# Patient Record
Sex: MALE | Race: AMERICAN INDIAN OR ALASKA NATIVE | NOT HISPANIC OR LATINO | Employment: UNEMPLOYED | URBAN - METROPOLITAN AREA
[De-identification: names, ages, dates, MRNs, and addresses within clinical notes are randomized per-mention and may not be internally consistent; named-entity substitution may affect disease eponyms.]

---

## 2018-09-03 ENCOUNTER — NURSE TRIAGE (OUTPATIENT)
Dept: NURSING | Facility: CLINIC | Age: 38
End: 2018-09-03

## 2018-09-03 ENCOUNTER — HOSPITAL ENCOUNTER (EMERGENCY)
Facility: CLINIC | Age: 38
Discharge: ANOTHER HOSPITAL | End: 2018-09-03
Attending: EMERGENCY MEDICINE | Admitting: EMERGENCY MEDICINE
Payer: COMMERCIAL

## 2018-09-03 VITALS
HEART RATE: 95 BPM | TEMPERATURE: 98.2 F | SYSTOLIC BLOOD PRESSURE: 144 MMHG | RESPIRATION RATE: 20 BRPM | DIASTOLIC BLOOD PRESSURE: 98 MMHG | WEIGHT: 224 LBS | OXYGEN SATURATION: 96 %

## 2018-09-03 DIAGNOSIS — F19.959 DRUG-INDUCED PSYCHOTIC DISORDER WITH COMPLICATION (H): ICD-10-CM

## 2018-09-03 DIAGNOSIS — R45.1 AGITATION REQUIRING SEDATION PROTOCOL: ICD-10-CM

## 2018-09-03 DIAGNOSIS — F15.10 METHAMPHETAMINE ABUSE (H): ICD-10-CM

## 2018-09-03 LAB
ALBUMIN SERPL-MCNC: 4.3 G/DL (ref 3.4–5)
ALP SERPL-CCNC: 83 U/L (ref 40–150)
ALT SERPL W P-5'-P-CCNC: 62 U/L (ref 0–70)
ANION GAP SERPL CALCULATED.3IONS-SCNC: 10 MMOL/L (ref 3–14)
ANION GAP SERPL CALCULATED.3IONS-SCNC: 12 MMOL/L (ref 3–14)
APAP SERPL-MCNC: <2 MG/L (ref 10–20)
AST SERPL W P-5'-P-CCNC: 76 U/L (ref 0–45)
BASE DEFICIT BLDV-SCNC: 0.8 MMOL/L
BASOPHILS # BLD AUTO: 0.1 10E9/L (ref 0–0.2)
BASOPHILS NFR BLD AUTO: 0.5 %
BILIRUB SERPL-MCNC: 1 MG/DL (ref 0.2–1.3)
BUN SERPL-MCNC: 16 MG/DL (ref 7–30)
BUN SERPL-MCNC: 18 MG/DL (ref 7–30)
CALCIUM SERPL-MCNC: 8.3 MG/DL (ref 8.5–10.1)
CALCIUM SERPL-MCNC: 8.4 MG/DL (ref 8.5–10.1)
CHLORIDE SERPL-SCNC: 104 MMOL/L (ref 94–109)
CHLORIDE SERPL-SCNC: 105 MMOL/L (ref 94–109)
CO2 SERPL-SCNC: 25 MMOL/L (ref 20–32)
CO2 SERPL-SCNC: 25 MMOL/L (ref 20–32)
CREAT SERPL-MCNC: 0.95 MG/DL (ref 0.66–1.25)
CREAT SERPL-MCNC: 0.97 MG/DL (ref 0.66–1.25)
DIFFERENTIAL METHOD BLD: NORMAL
EOSINOPHIL # BLD AUTO: 0 10E9/L (ref 0–0.7)
EOSINOPHIL NFR BLD AUTO: 0.1 %
ERYTHROCYTE [DISTWIDTH] IN BLOOD BY AUTOMATED COUNT: 11.7 % (ref 10–15)
ETHANOL SERPL-MCNC: <0.01 G/DL
GFR SERPL CREATININE-BSD FRML MDRD: 88 ML/MIN/1.7M2
GFR SERPL CREATININE-BSD FRML MDRD: 90 ML/MIN/1.7M2
GLUCOSE SERPL-MCNC: 107 MG/DL (ref 70–99)
GLUCOSE SERPL-MCNC: 93 MG/DL (ref 70–99)
HCO3 BLDV-SCNC: 24 MMOL/L (ref 21–28)
HCT VFR BLD AUTO: 44.1 % (ref 40–53)
HGB BLD-MCNC: 15.5 G/DL (ref 13.3–17.7)
IMM GRANULOCYTES # BLD: 0 10E9/L (ref 0–0.4)
IMM GRANULOCYTES NFR BLD: 0.4 %
INTERPRETATION ECG - MUSE: NORMAL
INTERPRETATION ECG - MUSE: NORMAL
LACTATE SERPL-SCNC: 1.4 MMOL/L (ref 0.4–2)
LYMPHOCYTES # BLD AUTO: 2.4 10E9/L (ref 0.8–5.3)
LYMPHOCYTES NFR BLD AUTO: 21.7 %
MCH RBC QN AUTO: 30.4 PG (ref 26.5–33)
MCHC RBC AUTO-ENTMCNC: 35.1 G/DL (ref 31.5–36.5)
MCV RBC AUTO: 87 FL (ref 78–100)
MONOCYTES # BLD AUTO: 1.1 10E9/L (ref 0–1.3)
MONOCYTES NFR BLD AUTO: 10 %
NEUTROPHILS # BLD AUTO: 7.4 10E9/L (ref 1.6–8.3)
NEUTROPHILS NFR BLD AUTO: 67.3 %
NRBC # BLD AUTO: 0 10*3/UL
NRBC BLD AUTO-RTO: 0 /100
O2/TOTAL GAS SETTING VFR VENT: ABNORMAL %
OXYHGB MFR BLDV: 84 %
PCO2 BLDV: 39 MM HG (ref 40–50)
PH BLDV: 7.4 PH (ref 7.32–7.43)
PLATELET # BLD AUTO: 280 10E9/L (ref 150–450)
PO2 BLDV: 50 MM HG (ref 25–47)
POTASSIUM SERPL-SCNC: 3 MMOL/L (ref 3.4–5.3)
POTASSIUM SERPL-SCNC: 3.3 MMOL/L (ref 3.4–5.3)
PROT SERPL-MCNC: 7.4 G/DL (ref 6.8–8.8)
RBC # BLD AUTO: 5.1 10E12/L (ref 4.4–5.9)
SALICYLATES SERPL-MCNC: <2 MG/DL
SODIUM SERPL-SCNC: 140 MMOL/L (ref 133–144)
SODIUM SERPL-SCNC: 141 MMOL/L (ref 133–144)
TROPONIN I SERPL-MCNC: <0.015 UG/L (ref 0–0.04)
WBC # BLD AUTO: 11 10E9/L (ref 4–11)

## 2018-09-03 PROCEDURE — 25000125 ZZHC RX 250

## 2018-09-03 PROCEDURE — 84484 ASSAY OF TROPONIN QUANT: CPT | Performed by: EMERGENCY MEDICINE

## 2018-09-03 PROCEDURE — 96361 HYDRATE IV INFUSION ADD-ON: CPT

## 2018-09-03 PROCEDURE — 80053 COMPREHEN METABOLIC PANEL: CPT | Performed by: EMERGENCY MEDICINE

## 2018-09-03 PROCEDURE — 25000132 ZZH RX MED GY IP 250 OP 250 PS 637: Performed by: EMERGENCY MEDICINE

## 2018-09-03 PROCEDURE — 90791 PSYCH DIAGNOSTIC EVALUATION: CPT

## 2018-09-03 PROCEDURE — 99285 EMERGENCY DEPT VISIT HI MDM: CPT | Mod: 25

## 2018-09-03 PROCEDURE — 82805 BLOOD GASES W/O2 SATURATION: CPT | Performed by: EMERGENCY MEDICINE

## 2018-09-03 PROCEDURE — 83605 ASSAY OF LACTIC ACID: CPT | Performed by: EMERGENCY MEDICINE

## 2018-09-03 PROCEDURE — 80329 ANALGESICS NON-OPIOID 1 OR 2: CPT | Performed by: EMERGENCY MEDICINE

## 2018-09-03 PROCEDURE — 80329 ANALGESICS NON-OPIOID 1 OR 2: CPT | Mod: 91 | Performed by: EMERGENCY MEDICINE

## 2018-09-03 PROCEDURE — 25000128 H RX IP 250 OP 636: Performed by: EMERGENCY MEDICINE

## 2018-09-03 PROCEDURE — 80307 DRUG TEST PRSMV CHEM ANLYZR: CPT | Mod: 59 | Performed by: EMERGENCY MEDICINE

## 2018-09-03 PROCEDURE — 96372 THER/PROPH/DIAG INJ SC/IM: CPT

## 2018-09-03 PROCEDURE — 25000125 ZZHC RX 250: Performed by: EMERGENCY MEDICINE

## 2018-09-03 PROCEDURE — 81001 URINALYSIS AUTO W/SCOPE: CPT | Mod: XU | Performed by: EMERGENCY MEDICINE

## 2018-09-03 PROCEDURE — 96375 TX/PRO/DX INJ NEW DRUG ADDON: CPT

## 2018-09-03 PROCEDURE — 80320 DRUG SCREEN QUANTALCOHOLS: CPT | Mod: 59 | Performed by: EMERGENCY MEDICINE

## 2018-09-03 PROCEDURE — 80299 QUANTITATIVE ASSAY DRUG: CPT | Mod: 91 | Performed by: EMERGENCY MEDICINE

## 2018-09-03 PROCEDURE — 80307 DRUG TEST PRSMV CHEM ANLYZR: CPT | Performed by: EMERGENCY MEDICINE

## 2018-09-03 PROCEDURE — 80048 BASIC METABOLIC PNL TOTAL CA: CPT | Performed by: EMERGENCY MEDICINE

## 2018-09-03 PROCEDURE — 85025 COMPLETE CBC W/AUTO DIFF WBC: CPT | Performed by: EMERGENCY MEDICINE

## 2018-09-03 PROCEDURE — 36415 COLL VENOUS BLD VENIPUNCTURE: CPT | Performed by: EMERGENCY MEDICINE

## 2018-09-03 PROCEDURE — 96374 THER/PROPH/DIAG INJ IV PUSH: CPT

## 2018-09-03 PROCEDURE — 93005 ELECTROCARDIOGRAM TRACING: CPT

## 2018-09-03 PROCEDURE — 93005 ELECTROCARDIOGRAM TRACING: CPT | Mod: 76

## 2018-09-03 RX ORDER — POTASSIUM CL/LIDO/0.9 % NACL 10MEQ/0.1L
10 INTRAVENOUS SOLUTION, PIGGYBACK (ML) INTRAVENOUS
Status: DISCONTINUED | OUTPATIENT
Start: 2018-09-03 | End: 2018-09-03 | Stop reason: HOSPADM

## 2018-09-03 RX ORDER — KETAMINE HYDROCHLORIDE 100 MG/ML
400 INJECTION INTRAMUSCULAR; INTRAVENOUS ONCE
Status: COMPLETED | OUTPATIENT
Start: 2018-09-03 | End: 2018-09-03

## 2018-09-03 RX ORDER — LORAZEPAM 2 MG/ML
0.5 INJECTION INTRAMUSCULAR ONCE
Status: DISCONTINUED | OUTPATIENT
Start: 2018-09-03 | End: 2018-09-03

## 2018-09-03 RX ORDER — POTASSIUM CHLORIDE 1.5 G/1.58G
20 POWDER, FOR SOLUTION ORAL ONCE
Status: COMPLETED | OUTPATIENT
Start: 2018-09-03 | End: 2018-09-03

## 2018-09-03 RX ORDER — LORAZEPAM 2 MG/ML
1 INJECTION INTRAMUSCULAR ONCE
Status: DISCONTINUED | OUTPATIENT
Start: 2018-09-03 | End: 2018-09-03

## 2018-09-03 RX ORDER — DIAZEPAM 10 MG/2ML
5 INJECTION, SOLUTION INTRAMUSCULAR; INTRAVENOUS ONCE
Status: DISCONTINUED | OUTPATIENT
Start: 2018-09-03 | End: 2018-09-03

## 2018-09-03 RX ORDER — LORAZEPAM 1 MG/1
2 TABLET ORAL ONCE
Status: COMPLETED | OUTPATIENT
Start: 2018-09-03 | End: 2018-09-03

## 2018-09-03 RX ORDER — KETAMINE HYDROCHLORIDE 100 MG/ML
INJECTION, SOLUTION INTRAMUSCULAR; INTRAVENOUS
Status: COMPLETED
Start: 2018-09-03 | End: 2018-09-03

## 2018-09-03 RX ORDER — LORAZEPAM 2 MG/ML
2 INJECTION INTRAMUSCULAR ONCE
Status: COMPLETED | OUTPATIENT
Start: 2018-09-03 | End: 2018-09-03

## 2018-09-03 RX ADMIN — LORAZEPAM 2 MG: 1 TABLET ORAL at 16:24

## 2018-09-03 RX ADMIN — KETAMINE HYDROCHLORIDE 400 MG: 100 INJECTION, SOLUTION, CONCENTRATE INTRAMUSCULAR; INTRAVENOUS at 17:35

## 2018-09-03 RX ADMIN — LORAZEPAM 2 MG: 2 INJECTION INTRAMUSCULAR; INTRAVENOUS at 18:37

## 2018-09-03 RX ADMIN — SODIUM CHLORIDE 1000 ML: 9 INJECTION, SOLUTION INTRAVENOUS at 18:37

## 2018-09-03 RX ADMIN — POTASSIUM CHLORIDE 10 MEQ: 149 INJECTION, SOLUTION, CONCENTRATE INTRAVENOUS at 16:15

## 2018-09-03 RX ADMIN — KETAMINE HYDROCHLORIDE 400 MG: 100 INJECTION INTRAMUSCULAR; INTRAVENOUS at 17:35

## 2018-09-03 RX ADMIN — POTASSIUM CHLORIDE 20 MEQ: 1.5 POWDER, FOR SOLUTION ORAL at 16:14

## 2018-09-03 RX ADMIN — SODIUM CHLORIDE 1000 ML: 9 INJECTION, SOLUTION INTRAVENOUS at 11:26

## 2018-09-03 NOTE — ED TRIAGE NOTES
"Pt presents via EMS for stating he \"paniced\" after having \"shot up\" and ate some meth but unsure what time. Pt is cooperative, A&O, ABC's intact.   "

## 2018-09-03 NOTE — ED PROVIDER NOTES
"  History     Chief Complaint:  Drug use    HPI   David Crane is a 38 year old male who presents to the emergency department for meth use. He states that he has been living at a hotel in [place] for the past two days because his wife was tired of his drug use. Today is Monday (Labor Day) and the patient states he was \"kicked out\" of his house on Saturday.  The patient states that he was in his hotel room this morning and \"shot up with meth\" and subsequently ate \"whatever was left\" but is unsure of when this occurred.  Patient thinks this was several hours ago but is unclear.  He called 911 due to \"freaking out\" and presents for evaluation.   He notes that he has been using meth the past 2.5 days and typically has several day binges when he uses drugs every three to six months or so, per patient's report. He did not drink any alcohol.   Patient works for [employer] and is  with an infant child at home.  No suicidal or homicidal ideations.    Allergies:  No known drug allergies     Medications:    Tobradex       Past Medical History:    Drug abuse    Past Surgical History:    Strabismus surgery     Family History:    History reviewed. No pertinent family history.     Social History:  Smoking status: Former Smoker  Alcohol use: Yes    Marital Status:          Review of Systems   Psychiatric/Behavioral: Positive for self-injury.     Pt presents via EMS for stating he \"panicked\" after having \"shot up\" and ate some meth but unsure what time.     Physical Exam     Patient Vitals for the past 24 hrs:   BP Temp Temp src Pulse Resp SpO2 Weight   09/03/18 1057 (!) 156/112 98.2  F (36.8  C) Oral 78 20 98 % 101.6 kg (224 lb)         Physical Exam  GEN: patient conversive but hyperaware of surroundings, not diaphoretic  HEAD: atraumatic, normocephalic  EYES: pupils reactive (5plus and reactive), extraocular muscles intact, conjunctivae normal  ENT: TMs flat and white bilaterally, oropharynx normal with no erythema or " exudate, mucus membranes dry  NECK: no posterior midline tenderness, no cervical LAD  RESPIRATORY: no tachypnea, breath sounds clear to auscultation (no rales, wheezes, rhonchi), chest wall nontender, normal phonation  CVS: normal S1/S2, no murmurs/rubs/gallops, tachycardic  ABDOMEN: soft, nontender, no masses or organomegaly, no rebound, decreased bowel sounds  BACK: no spinal tenderness  EXTREMITIES: intact pulses x 4, full range of motion at joints, no edema  MUSCULOSKELETAL: no deformities  SKIN: warm and dry, scratches on arms diffusely.  Track marks.  No warmth or erythema.  NEURO: GCS 14, cranial nerves intact.  Motor- moves all 4 extremities with 5/5 strength.  Sensation- intact  Reflexes- DTRs 2plus.  Coordination- not checked.  Overall symmetrical exam  HEME: no bruising        Emergency Department Course   ECG (11:11:28):  Rate 108 bpm. SD interval 146 . QRS duration 88. QT/QTc 344/460. P-R-T axes 32 21 37. Sinus tachycardia, otherwise normal ECG, Interpreted at 1111 by Alysia Hurd MD.  Sinus tachycardia with no STEMI    Laboratory:  CBC: WNL (WBC 11.0, HGB 15.5, )  CMP:Potassium 3.0 (L), Glucose 107 (H), Calcium 8.3 (L), AST 76 (H),  (Creatinine 0.97)    Alcohol ethyl <0.01  Acetaminophen <2  Salicylate <2    Troponin: <0.015    UA: pending  UDS: pending    Interventions:  1126 NaCl BOLUS 1000ml IV  1129 Ativan 1mg IV  Heplock  Cardiac/Sp02 monitoring  Oxygen by nasal cannula at 2L/min  Potassium 20mEq Kcl PO  Valium 5mg IV    Emergency Department Course:  Past medical records, nursing notes, and vitals reviewed.  1100: I performed an exam of the patient and obtained history, as documented above.    IV inserted and blood drawn.    Noon- patient sleeping      ED Course   2pm sleeping, awakens and still mildly confused    2:30pm signed out to Dr Sanchez  BP (!) 152/99  Pulse 78  Temp 98.2  F (36.8  C) (Oral)  Resp 20  Wt 101.6 kg (224 lb)  SpO2 98%      Impression & Plan      Medical  "Decision Making:  David Crane is a 38 year old gentleman who lives in [place] who admits that he has been on a \"nogueira\" the last couple of days using injection meth, however he \"freaked out\" and swallowed an unknown amount just prior to arrival. He does not feel suicidal or homicidal but is agitated and mildly confusion along with hypervigilance. When he first arrived his pupils were dilated, and he is hyperacute with his environment and hyperstimulation. An IV was placed and labs were sent. Potassium is low at 3, he has been given oral and IV potassium repletion.  Ativan was given to facilitate relaxation.  HAMLET was signed.    CBC with no anemia and normal WBC.   Ethanol was normal, tylenol/salicylates were normal and troponin is otherwise normal. His EKG showed sign of tachycardia. We are waiting on urine analysis and the drugs of abuse and he is getting potassium.  Encouraged nursing staff to catheterization for a UA.  IV hydration initiated.   Later in the day DEC will need to see him and talk with him but at this point he is not medically stable. He will be signed out to the oncoming physician at 2pm.     Needs IV fluids and further observation.  Not diaphoretic or tachycardic but patient is hypertensive.    Diagnosis:  Drug ingestion    Disposition:  Patient was signed out to my partner Dr Sanchez    Discharge Medications:  New Prescriptions    No medications on file         Parker Sky  9/3/2018   [facility] EMERGENCY DEPARTMENT    Scribe Disclosure:  I, Parker Sky, am serving as a scribe at 11:00 AM on 9/3/2018 to document services personally performed by Alysia Hurd MD based on my observations and the provider's statements to me.        Alysia Hurd MD  09/03/18 2021       Alysia Hurd MD  09/03/18 2024    "

## 2018-09-03 NOTE — ED NOTES
Bed: ED04  Expected date: 9/3/18  Expected time: 10:42 AM  Means of arrival: Ambulance  Comments:  38 M

## 2018-09-03 NOTE — ED PROVIDER NOTES
This patient was received in sign out from Dr. Hurd, please see her note for initial presentation and ED course.  At time of signout, patient was being monitored for methamphetamine ingestion after he reported swallowing an unknown amount of methamphetamine.  His intent for self-harm versus recreation is unknown at this time.    Upon my evaluation patient appeared confused, with pressured speech and labile mood unable to participate in a coherent conversation, peering leering around the room, fidgeting with elevated heart rate and blood pressure on the monitor consistent with sympathomimetic ingestion and possible drug-induced psychosis.  He was offered both IV and p.o. Ativan to counteract his presumed methamphetamine ingestion but he declined demanding to be transferred to group home or shelter.  After taking 2 mg of p.o. Ativan, he was able to participate in evaluation with the mental health  who felt he likely could go home with close follow-up as an outpatient for chemical dependency given he likely was undergoing acute distress after situational complications with his significant other when he was kicked out of the home on Saturday and that his events today did not likely represent suicidal gesture.     However, during the interview his wife and young infant daughter arrived at which point he attempted to leave his room and was reminded by security to stay in his room given his hold status. He then aggressively grabbed his young infant who was in a stroller and pulled her into the room pushing her in a corner and positioning himself between his daughter and the door in a threatening, challenging manner.  With concern for the patient's escalating behavior and his young infant in the room security attempted to physically subdue the patient. He became supremely agitated requiring a code 21 activation, which is a behavioral emergency activation with multiple nurses and security guards required to take the  patient down to the ground.  He was given 400 mg of IM ketamine for chemical sedation due to his severe agitation, erratic behavior and threatening gesture.  He was then placed in 5 point restraints,  back on a cardiac monitor, and EKG and labs were sent. His breathing status was closely monitoring. He continued to be agitated requiring several doses of IV Ativan which improved his hypertension and tachycardia. IV fluid also given. His lab work was unrevealing.  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      Initial plan was for admission to the ICU here at [facility] but due to bed availability in the ICU, he was not accepted here.  Plan was then to transfer patient to [facility]; however, due to patient's preference as expressed by his wife, he preferred not be in the [facility] system due to an unknown issue. As such, transfer to [facility] was arranged as this is the closest appropriate hospital that is also closest to the patient's home.  The patient's wife became upset demanding to dictate patient's care, demanding to approve of all medications and then demanded to have the patient transferred to [facility] despite this being within the [facility] system and against the preferences she had already stated.  Due to an escalation of inappropriate and aggressive behavior that was interfering with both this patient and other patients' care, she was asked to leave and refused.  As a result, Taiwo MCGREGOR arrived to reiterate that the patient does not have legal rights in the setting of a 72 hour hold despite being his wife and she was escorted off the property.    Patient was transferred to [facility] with planned admission to medical ICU team for ongoing monitoring of drug-induced psychosis in  the setting of methamphetamine abuse and ingestion.  He warranted ICU admission for close hemodynamic monitoring given unknown amount of meth ingestion in unknown packaging with difficult to predict pharmacokinetics.  He remained hemodynamically stable while in the emergency department after the above sedation and his airway remained patent and he was protecting it.  He was transferred via ACLS to [facility].    Stephan Sanchez MD   [employer]  11:28 PM 09/03/18        Laura, Stephan Mitchell MD  09/03/18 3144

## 2018-09-04 NOTE — ED NOTES
"Pts wife notified of 72hour hold by charge RN, pts wife continues to insist that she has \"half the legal right to make medical decisions for her \" despite having HAMLET and hold information described to her multiple times by 2 RNs and charge RN. Pts wife threatening to call 911, charge RN notified PD about situation. PD spoke with pt while in ED, provided additional information and then escorted wife out of the building. [facility] charge notified of situation.   "

## 2018-09-04 NOTE — TELEPHONE ENCOUNTER
Parent calling wanting to know where patient has been admitted, but there is no consent to communicate.  Recommended caller communicate directly with family for information.  Josie Leigh RN  [facility]       Reason for Disposition    [1] Caller requesting NON-URGENT health information AND [2] PCP's office is the best resource    Protocols used: INFORMATION ONLY CALL-ADULT-

## 2018-09-04 NOTE — ED NOTES
"Pt's wife and infant child presented and were outside of pt room in ruiz. Pt tried to get out into the ruiz and security asked him to stay in the room. Pt's wife eventually started to enter the room with stroller first, pt took ahold of stroller and had a \"look of rage\". I took pt's wrist and calmed asked pt to let go and then pt proceeded to push the stroller towards the far corner of the room when security attempted to contain the pt while I tried to protect the infant from harm. Code 21 was called. Pt was medicated and put in restraints.   "

## 2018-09-16 ENCOUNTER — OFFICE VISIT (OUTPATIENT)
Dept: URGENT CARE | Facility: URGENT CARE | Age: 38
End: 2018-09-16
Payer: COMMERCIAL

## 2018-09-16 VITALS
DIASTOLIC BLOOD PRESSURE: 78 MMHG | HEART RATE: 104 BPM | OXYGEN SATURATION: 96 % | TEMPERATURE: 98 F | SYSTOLIC BLOOD PRESSURE: 136 MMHG | WEIGHT: 226 LBS

## 2018-09-16 DIAGNOSIS — J00 ACUTE RHINITIS, UNSPECIFIED TYPE: Primary | ICD-10-CM

## 2018-09-16 PROCEDURE — 99203 OFFICE O/P NEW LOW 30 MIN: CPT | Performed by: FAMILY MEDICINE

## 2018-09-16 RX ORDER — FLUTICASONE PROPIONATE 50 MCG
1-2 SPRAY, SUSPENSION (ML) NASAL DAILY
COMMUNITY
End: 2019-06-28

## 2018-09-16 RX ORDER — AMOXICILLIN 875 MG
875 TABLET ORAL 2 TIMES DAILY
Qty: 20 TABLET | Refills: 0 | Status: SHIPPED | OUTPATIENT
Start: 2018-09-16 | End: 2018-12-10

## 2018-09-16 RX ORDER — AMOXICILLIN 875 MG
875 TABLET ORAL 2 TIMES DAILY
Qty: 20 TABLET | Refills: 0 | Status: SHIPPED | OUTPATIENT
Start: 2018-09-16 | End: 2018-09-16

## 2018-09-16 NOTE — MR AVS SNAPSHOT
"              After Visit Summary   2018    David Crane    MRN: [id number]           Patient Information     Date Of Birth          1980        Visit Information        Provider Department      2018 3:50 PM Domingo Gaines MD [facility]        Today's Diagnoses     Acute rhinitis, unspecified type    -  1      Care Instructions    follow up with your primary care provider if not better in 10 days.      Continue the Flonase               Follow-ups after your visit        Who to contact     If you have questions or need follow up information about today's clinic visit or your schedule please contact [facility] directly at [phone].  Normal or non-critical lab and imaging results will be communicated to you by [employer], letter or phone within 4 business days after the clinic has received the results. If you do not hear from us within 7 days, please contact the clinic through [employer] or phone. If you have a critical or abnormal lab result, we will notify you by phone as soon as possible.  Submit refill requests through [employer] or call your pharmacy and they will forward the refill request to us. Please allow 3 business days for your refill to be completed.          Additional Information About Your Visit        MyChart Information     [employer] lets you send messages to your doctor, view your test results, renew your prescriptions, schedule appointments and more. To sign up, go to [email or web address] . Click on \"Log in\" on the left side of the screen, which will take you to the Welcome page. Then click on \"Sign up Now\" on the right side of the page.     You will be asked to enter the access code listed below, as well as some personal information. Please follow the directions to create your username and password.     Your access code is: [email or web address]  Expires: 2018  4:17 PM     Your access code will  in 90 days. If you need help or a new code, please call your " [facility] or [phone].        Care EveryWhere ID     This is your Care EveryWhere ID. This could be used by other organizations to access your [place] medical records  [id number]        Your Vitals Were     Pulse Temperature Pulse Oximetry             104 98  F (36.7  C) (Tympanic) 96%          Blood Pressure from Last 3 Encounters:   09/16/18 136/78   09/03/18 (!) 144/98   04/27/06 140/70    Weight from Last 3 Encounters:   09/16/18 226 lb (102.5 kg)   09/03/18 224 lb (101.6 kg)   04/27/06 199 lb 12.8 oz (90.6 kg)              Today, you had the following     No orders found for display         Today's Medication Changes          These changes are accurate as of 9/16/18  4:26 PM.  If you have any questions, ask your nurse or doctor.               Start taking these medicines.        Dose/Directions    amoxicillin 875 MG tablet   Commonly known as:  AMOXIL   Used for:  Acute rhinitis, unspecified type   Started by:  Domingo Gaines MD        Dose:  875 mg   Take 1 tablet (875 mg) by mouth 2 times daily for 10 days   Quantity:  20 tablet   Refills:  0            Where to get your medicines      These medications were sent to [id number] - [place], [place] - [address]  [address] [address]     Phone:  [phone]     amoxicillin 875 MG tablet                Primary Care Provider Fax #    Physician No Ref-Primary [phone]       No address on file        Equal Access to Services     ROYER CARR AH: Hadii julian rubi Soramon, waaxda luqadaha, qaybta kaalmada adebrandon, waxcathy darryl manzo adejoel max. So [facility] [phone].    ATENCIÓN: Si habla español, tiene a burns disposición servicios gratuitos de asistencia lingüística. Latrice al [phone].    We comply with applicable federal civil rights laws and [place] laws. We do not discriminate on the basis of race, color, national origin, age, disability, sex, sexual orientation, or gender  identity.            Thank you!     Thank you for choosing [facility]  for your care. Our goal is always to provide you with excellent care. Hearing back from our patients is one way we can continue to improve our services. Please take a few minutes to complete the written survey that you may receive in the mail after your visit with us. Thank you!             Your Updated Medication List - Protect others around you: Learn how to safely use, store and throw away your medicines at www.disposemymeds.org.          This list is accurate as of 9/16/18  4:26 PM.  Always use your most recent med list.                   Brand Name Dispense Instructions for use Diagnosis    amoxicillin 875 MG tablet    AMOXIL    20 tablet    Take 1 tablet (875 mg) by mouth 2 times daily for 10 days    Acute rhinitis, unspecified type       fluticasone 50 MCG/ACT spray    FLONASE     Spray 1 spray into both nostrils daily        MULTIVITAMIN/MINERAL FORMULA Tabs      1 TABLET DAILY        TOBRADEX 0.3-0.1 % ophthalmic susp   Generic drug:  tobramycin-dexamethasone      every day at bedtime in the left eye only

## 2018-09-16 NOTE — PATIENT INSTRUCTIONS
follow up with your primary care provider if not better in 10 days.      Continue the Flonase

## 2018-09-16 NOTE — PROGRESS NOTES
SUBJECTIVE:   David Crane is a 38 year old male presenting with a chief complaint of cold symptoms (stuffy nose), sinus pain and pressure (at the right cheek), headache (behind the right eye), right upper teeth pain,   Onset of symptoms was 8 days ago.  Course of illness is worsening..    Severity mild-moderate symptoms.   Current and Associated symptoms: as listed above.   Treatment measures tried include Flonase.  Predisposing factors include chronic nasal congestion.  .    Past Medical History:   Diagnosis Date     Other, mixed, or unspecified nondependent drug abuse, in remission     History of IV drug use, marijuana and other drugs.  Sober since 2003.     Current Outpatient Prescriptions   Medication Sig Dispense Refill     fluticasone (FLONASE) 50 MCG/ACT spray Spray 1 spray into both nostrils daily       MULTIVITAMIN/MINERAL FORMULA OR TABS 1 TABLET DAILY       TOBRADEX 0.3-0.1 % OP SUSP every day at bedtime in the left eye only       Social History   Substance Use Topics     Smoking status: Former Smoker     Packs/day: 0.25     Years: 9.00     Types: Cigarettes     Smokeless tobacco: Current User     Types: Snuff      Comment: 4-20  quit about 2 months ago     Alcohol use Yes       ROS:  Review of systems negative except as stated above.    OBJECTIVE:  /78  Pulse 104  Temp 98  F (36.7  C) (Tympanic)  Wt 226 lb (102.5 kg)  SpO2 96%  GENERAL APPEARANCE: healthy, alert and no distress  HENT: TM's normal bilaterally, nasal turbinates erythematous, swollen and oral mucous membranes moist, no erythema noted  NECK: supple, nontender, no lymphadenopathy  RESP: lungs clear to auscultation - no rales, rhonchi or wheezes  CV: regular rates and rhythm, normal S1 S2, no murmur noted    ASSESSMENT:  Acute Rhinitis    PLAN:  Rx:  Amoxicillin  Continue the Flonase  follow up with your primary care provider if not better in 10 days.   See orders in Epic      Domingo Gaines MD

## 2018-09-20 ENCOUNTER — OFFICE VISIT (OUTPATIENT)
Dept: URGENT CARE | Facility: URGENT CARE | Age: 38
End: 2018-09-20
Payer: COMMERCIAL

## 2018-09-20 VITALS
HEART RATE: 76 BPM | DIASTOLIC BLOOD PRESSURE: 80 MMHG | OXYGEN SATURATION: 96 % | WEIGHT: 223 LBS | RESPIRATION RATE: 12 BRPM | SYSTOLIC BLOOD PRESSURE: 136 MMHG | TEMPERATURE: 98.1 F

## 2018-09-20 DIAGNOSIS — G43.109 OCULAR MIGRAINE: Primary | ICD-10-CM

## 2018-09-20 PROCEDURE — 99213 OFFICE O/P EST LOW 20 MIN: CPT | Performed by: FAMILY MEDICINE

## 2018-09-20 NOTE — MR AVS SNAPSHOT
"              After Visit Summary   2018    David Crane    MRN: [id number]           Patient Information     Date Of Birth          1980        Visit Information        Provider Department      2018 5:05 PM Sil Goldstein MD [facility]        Today's Diagnoses     Ocular migraine    -  1       Follow-ups after your visit        Who to contact     If you have questions or need follow up information about today's clinic visit or your schedule please contact [facility] directly at [phone].  Normal or non-critical lab and imaging results will be communicated to you by MyChart, letter or phone within 4 business days after the clinic has received the results. If you do not hear from us within 7 days, please contact the clinic through [employer] or phone. If you have a critical or abnormal lab result, we will notify you by phone as soon as possible.  Submit refill requests through [employer] or call your pharmacy and they will forward the refill request to us. Please allow 3 business days for your refill to be completed.          Additional Information About Your Visit        MyChart Information     [employer] lets you send messages to your doctor, view your test results, renew your prescriptions, schedule appointments and more. To sign up, go to [email or web address] . Click on \"Log in\" on the left side of the screen, which will take you to the Welcome page. Then click on \"Sign up Now\" on the right side of the page.     You will be asked to enter the access code listed below, as well as some personal information. Please follow the directions to create your username and password.     Your access code is: [email or web address]  Expires: 2018  4:17 PM     Your access code will  in 90 days. If you need help or a new code, please call your [facility] clinic or [phone].        Care EveryWhere ID     This is your Care EveryWhere ID. This could be used by other organizations to " access your [place] medical records  [id number]        Your Vitals Were     Pulse Temperature Respirations Pulse Oximetry          76 98.1  F (36.7  C) (Oral) 12 96%         Blood Pressure from Last 3 Encounters:   09/20/18 136/80   09/16/18 136/78   09/03/18 (!) 144/98    Weight from Last 3 Encounters:   09/20/18 223 lb (101.2 kg)   09/16/18 226 lb (102.5 kg)   09/03/18 224 lb (101.6 kg)              Today, you had the following     No orders found for display       Primary Care Provider Fax #    Physician No Ref-Primary [phone]       No address on file        Equal Access to Services     ROYER CARR : Virginia Hernandez, loyda soria, michael vela, ly linares . So [facility] [phone].    ATENCIÓN: Si habla español, tiene a burns disposición servicios gratuitos de asistencia lingüística. Llame al [phone].    We comply with applicable federal civil rights laws and [place] laws. We do not discriminate on the basis of race, color, national origin, age, disability, sex, sexual orientation, or gender identity.            Thank you!     Thank you for choosing [facility]  for your care. Our goal is always to provide you with excellent care. Hearing back from our patients is one way we can continue to improve our services. Please take a few minutes to complete the written survey that you may receive in the mail after your visit with us. Thank you!             Your Updated Medication List - Protect others around you: Learn how to safely use, store and throw away your medicines at www.disposemymeds.org.          This list is accurate as of 9/20/18  7:57 PM.  Always use your most recent med list.                   Brand Name Dispense Instructions for use Diagnosis    amoxicillin 875 MG tablet    AMOXIL    20 tablet    Take 1 tablet (875 mg) by mouth 2 times daily    Acute rhinitis, unspecified type       fluticasone 50 MCG/ACT spray    FLONASE      Spray 1 spray into both nostrils daily        MULTIVITAMIN/MINERAL FORMULA Tabs      1 TABLET DAILY        TOBRADEX 0.3-0.1 % ophthalmic susp   Generic drug:  tobramycin-dexamethasone      every day at bedtime in the left eye only

## 2018-09-20 NOTE — NURSING NOTE
"Chief Complaint   Patient presents with     Urgent Care     Ocular Migraine Evaluation     Pt has had issues with vision over the past 3 days.  He hashad this same thing several times in the past. Usually he has a h/a with this but today he does  not have a h/a.  Right at the moment his vision is ok, episodes last 30 minutes to an hour.       Initial /80  Pulse 76  Temp 98.1  F (36.7  C) (Oral)  Resp 12  Wt 223 lb (101.2 kg)  SpO2 96% Estimated body mass index is 28.26 kg/(m^2) as calculated from the following:    Height as of 1/6/06: 5' 10.5\" (1.791 m).    Weight as of 4/27/06: 199 lb 12.8 oz (90.6 kg)..  BP completed using cuff size: kitty Gutiérrez R.N.    "

## 2018-09-21 NOTE — PROGRESS NOTES
SUBJECTIVE:   David Crane is a 38 year old male presenting with a chief complaint of ocular migraine   He has h/o ocular migraine and symptoms are just the same  He took 4 ibuprofen everytime he had it .  Onset of symptoms was 4 day(s) ago.  Course of illness is worsening.    Severity moderate  Current and Associated symptoms: none  Treatment measures tried include Tylenol/Ibuprofen.  Predisposing factors include None.    Past Medical History:   Diagnosis Date     Other, mixed, or unspecified nondependent drug abuse, in remission     History of IV drug use, marijuana and other drugs.  Sober since 2003.     Current Outpatient Prescriptions   Medication Sig Dispense Refill     amoxicillin (AMOXIL) 875 MG tablet Take 1 tablet (875 mg) by mouth 2 times daily 20 tablet 0     fluticasone (FLONASE) 50 MCG/ACT spray Spray 1 spray into both nostrils daily       MULTIVITAMIN/MINERAL FORMULA OR TABS 1 TABLET DAILY       TOBRADEX 0.3-0.1 % OP SUSP every day at bedtime in the left eye only       Social History   Substance Use Topics     Smoking status: Former Smoker     Packs/day: 0.25     Years: 9.00     Types: Cigarettes     Smokeless tobacco: Current User     Types: Snuff      Comment: 4-20  quit about 2 months ago     Alcohol use Yes       ROS:  10 point ROS of systems including Constitutional, Eyes, Respiratory, Cardiovascular, Gastroenterology, Genitourinary, Integumentary, Muscularskeletal, Psychiatric were all negative except for pertinent positives noted in my HPI     Family history reviewed in EPIC       OBJECTIVE:  /80  Pulse 76  Temp 98.1  F (36.7  C) (Oral)  Resp 12  Wt 223 lb (101.2 kg)  SpO2 96%  GENERAL APPEARANCE: healthy, alert and no distress  EYES: EOMI,  PERRL, conjunctiva clear  HENT: ear canals and TM's normal.  Nose and mouth without ulcers, erythema or lesions  NECK: supple, nontender, no lymphadenopathy  RESP: lungs clear to auscultation - no rales, rhonchi or wheezes  CV: regular rates and  rhythm, normal S1 S2, no murmur noted  ABDOMEN:  soft, nontender, no HSM or masses and bowel sounds normal  NEURO: Normal strength and tone, sensory exam grossly normal,  normal speech and mentation  Finger nose test intact , rapid alternating movements intact   SKIN: no suspicious lesions or rashes  PSYCH: mentation appears normal    ASSESSMENT:  David was seen today for urgent care and ocular migraine evaluation.    Diagnoses and all orders for this visit:    Ocular migraine            PLAN:  Assured pt   Advised to follow up for eye exam  Advised to do tylenol for the headache in the future   Follow up if  symptoms fail to improve or worsens   Pt understood and agreed with plan     See orders in Epic

## 2018-12-10 ENCOUNTER — OFFICE VISIT (OUTPATIENT)
Dept: URGENT CARE | Facility: URGENT CARE | Age: 38
End: 2018-12-10
Payer: COMMERCIAL

## 2018-12-10 VITALS
HEART RATE: 104 BPM | DIASTOLIC BLOOD PRESSURE: 80 MMHG | TEMPERATURE: 97.9 F | SYSTOLIC BLOOD PRESSURE: 108 MMHG | OXYGEN SATURATION: 100 %

## 2018-12-10 DIAGNOSIS — Z87.898 HX OF INTRAVENOUS DRUG USE, IN REMISSION: ICD-10-CM

## 2018-12-10 DIAGNOSIS — A08.4 VIRAL GASTROENTERITIS: Primary | ICD-10-CM

## 2018-12-10 PROCEDURE — 87389 HIV-1 AG W/HIV-1&-2 AB AG IA: CPT | Performed by: PHYSICIAN ASSISTANT

## 2018-12-10 PROCEDURE — 36415 COLL VENOUS BLD VENIPUNCTURE: CPT | Performed by: PHYSICIAN ASSISTANT

## 2018-12-10 PROCEDURE — 86803 HEPATITIS C AB TEST: CPT | Performed by: PHYSICIAN ASSISTANT

## 2018-12-10 PROCEDURE — 99214 OFFICE O/P EST MOD 30 MIN: CPT | Performed by: PHYSICIAN ASSISTANT

## 2018-12-11 LAB
HCV AB SERPL QL IA: NONREACTIVE
HIV 1+2 AB+HIV1 P24 AG SERPL QL IA: NONREACTIVE

## 2018-12-11 NOTE — PROGRESS NOTES
SUBJECTIVE:  Chief Complaint   Patient presents with     Urgent Care     Vomiting, diarrhea and fever-sx started yesterday. Patient states he has been sick for x3 weeks with cold sx.     David Crane is a 38 year old male whose symptoms began 1 day ago and include vomiting and diarrhea.    Symptoms are sudden onset and moderate. Patient notes that his symptoms have been improving.   Aggravating factors: nothing.    Alleviating factors:bowel movement  Associated symptoms:  Pain: All over discomfort  Fever: tactile fevers, have since resolved  Diarrhea:  consists of mulltiple stools/day and is decreasing  Stools: frequent  Appetite: normal  Risk factors: NONE. Patient denies sick contacts, possible bad food exposure, travel , recent antibiotic use, recent hospitalization and recent medication changes    Patient has been ill for the last 3 weeks with cough and cold symptoms. He believes that this has been 2 separate illnesses. He had a cold for one week, which improved and then contracted another virus which was much worse. He has been improving and has a slight intermittent nighttime cough.Patient has a history of IV drug use. Last IV drug use was 3 months back, he has not been tested for HIV or Hepatitis C and has concerns that this is where his symptoms are coming new infection.     Past Medical History:   Diagnosis Date     Other, mixed, or unspecified nondependent drug abuse, in remission     History of IV drug use, methamphetamine and other drugs.       .  Current Outpatient Medications   Medication Sig Dispense Refill     fluticasone (FLONASE) 50 MCG/ACT spray Spray 1 spray into both nostrils daily       MULTIVITAMIN/MINERAL FORMULA OR TABS 1 TABLET DAILY       Social History     Tobacco Use     Smoking status: Former Smoker     Packs/day: 0.25     Years: 9.00     Pack years: 2.25     Types: Cigarettes     Smokeless tobacco: Current User     Types: Snuff     Tobacco comment: 4-20  quit about 2 months ago    Substance Use Topics     Alcohol use: Yes       ROS:  Review of systems otherwise negative except as stated above.    OBJECTIVE:  /80   Pulse 104   Temp 97.9  F (36.6  C) (Tympanic)   SpO2 100%   GENERAL APPEARANCE: healthy, alert and no distress  EYES: EOMI,  PERRL, conjunctiva clear  HENT: ear canals and TM's normal.  Nose and mouth without ulcers, erythema or lesions. MMM  NECK: supple, nontender, no lymphadenopathy  RESP: lungs clear to auscultation - no rales, rhonchi or wheezes  CV: regular rates and rhythm, normal S1 S2, no murmur noted  ABDOMEN:  soft, nontender, no HSM or masses and bowel sounds normal  NEURO: Normal strength and tone, sensory exam grossly normal,  normal speech and mentation  SKIN: no suspicious lesions or rashes.   PSYCH: Affect and judgement normal.    ASSESSMENT / PLAN:  1. Viral gastroenteritis  Symptoms are improving, no sign of dehydration  BRAT diet encouraged, can use immodium for diarrhea     2. 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 His cough is improving, URI symptoms are mostly resolved.    He is currently attending NA meetings, but reports that he is not addicted and he chooses to use. I encouraged continuing with NA meetings and strong support system.   Will call if labs come back + and will defer treatment to ID.   - HIV Antigen Antibody Combo  - **Hepatitis C Screen Reflex to RNA FUTURE anytime      I have discussed the patient's diagnosis and my plan of treatment with the patient. Patient is aware to come back in with worsening symptoms or if no relief despite treatment plan.  Patient verbalizes understanding. All questions were addressed and answered.   Destiny Ortiz PA-C

## 2018-12-11 NOTE — PATIENT INSTRUCTIONS
"  Patient Education     Viral Gastroenteritis (Adult)    Gastroenteritis is commonly called the \"stomach flu,\" although it has nothing to do with influenza. It is most often caused by a virus that affects the stomach and intestinal tract and usually lasts from 2 to 7 days. Common viruses causing gastroenteritis include norovirus, rotavirus, and hepatitis A. Non-viral causes of gastroenteritis include bacteria, parasites, and toxins.  The danger from repeated vomiting or diarrhea is dehydration. This is the loss of too much fluid from the body. When this occurs, body fluids must be replaced. Antibiotics don't help with this illness because it is usually viral. Simple home treatment will be helpful.  Symptoms of viral gastroenteritis may include:    Watery, loose stools    Stomach pain or abdominal cramps    Fever and chills    Nausea and vomiting    Loss of bowel control    Headache  Home care  Gastroenteritis is transmitted by contact with the stool or vomit of an infected person. This can occur from person to person or from contact with a contaminated surface.  Follow these guidelines when caring for yourself at home:    If symptoms are severe, rest at home for the next 24 hours or until you are feeling better.    Wash your hands with soap and water or use alcohol-based  to prevent the spread of infection. Wash your hands after touching anyone who is sick.    Wash your hands or use alcohol-based  after using the toilet and before meals. Clean the toilet after each use.  Remember these tips when preparing food:    People with diarrhea should not prepare or serve food to others. When preparing foods, wash your hands before and after.    Wash your hands after using cutting boards, countertops, knives, or utensils that have been in contact with raw food.    Dry your hands with a single use towel.    Keep uncooked meats away from cooked and ready-to-eat foods.  Medicine  You may use acetaminophen or " NSAID medicines like ibuprofen or naproxen to control fever unless another medicine was given. If you have chronic liver or kidney disease, talk with your healthcare provider before using these medicines. Also talk with your provider if you've had a stomach ulcer or gastrointestinal bleeding. Don't give aspirin to anyone under 18 years of age who is ill with a fever. It may cause severe liver damage. Don't use NSAIDS is you are already taking one for another condition (like arthritis) or are on aspirin (such as for heart disease or after a stroke).  If medicine for vomiting or diarrhea are prescribed, take these only as directed. Nausea and diarrhea medicines are generally OK unless you have bleeding, fever, or severe abdominal pain.  Diet  Follow these guidelines for food:    Water and liquids are important so you don't get dehydrated. Drink a small amount at a time or suck on ice chips if you are vomiting.    If you eat, avoid fatty, greasy, spicy, or fried foods.    Don't eat dairy if you have diarrhea. This can make diarrhea worse.    Avoid tobacco, alcohol, and caffeine which may worsen symptoms.  During the first 24 hours (the first full day), follow the diet below:    Beverages. Sports drinks, soft drinks without caffeine, ginger ale, mineral water (plain or flavored), decaffeinated tea and coffee. If you are very dehydrated, sports drinks aren't a good choice. They have too much sugar and not enough electrolytes. In this case, commercially available products called oral rehydration solutions, are best.    Soups. Eat clear broth, consommé, and bouillon.    Desserts. Eat gelatin, ice pops, and fruit juice bars.  During the next 24 hours (the second day), you may add the following to the above:    Hot cereal, plain toast, bread, rolls, and crackers    Plain noodles, rice, mashed potatoes, chicken noodle or rice soup    Unsweetened canned fruit (avoid pineapple), bananas    Limit fat intake to less than 15 grams  per day. Do this by avoiding margarine, butter, oils, mayonnaise, sauces, gravies, fried foods, peanut butter, meat, poultry, and fish.    Limit fiber and avoid raw or cooked vegetables, fresh fruits (except bananas), and bran cereals.    Limit caffeine and chocolate. Don't use spices or seasonings other than salt.    Limit dairy products.    Avoid alcohol.  During the next 24 hours:    Gradually resume a normal diet as you feel better and your symptoms improve.    If at any time it starts getting worse again, go back to clear liquids until you feel better.  Follow-up care  Follow up with your healthcare provider, or as advised. Call your provider if you don't get better within 24 hours or if diarrhea lasts more than a week. Also follow up if you are unable to keep down liquids and get dehydrated. If a stool (diarrhea) sample was taken, call as directed for the results.  Call 911  Call 911 if any of these occur:    Trouble breathing    Chest pain    Confused    Severe drowsiness or trouble awakening    Fainting or loss of consciousness    Rapid heart rate    Seizure    Stiff neck   When to seek medical advice  Call your healthcare provider right away if any of these occur:    Abdominal pain that gets worse    Continued vomiting (unable to keep liquids down)    Frequent diarrhea (more than 5 times a day)    Blood in vomit or stool (black or red color)    Dark urine, reduced urine output, or extreme thirst    Weakness or dizziness    Drowsiness    Fever of 100.4 F (38 C) or higher, or as directed by your healthcare provider    New rash  Date Last Reviewed: 6/1/2018 2000-2018 [employer]. [address] [place] [address], [place] [address]. All rights reserved. This information is not intended as a substitute for professional medical care. Always follow your healthcare professional's instructions.

## 2019-04-22 ENCOUNTER — ANCILLARY PROCEDURE (OUTPATIENT)
Dept: GENERAL RADIOLOGY | Facility: CLINIC | Age: 39
End: 2019-04-22
Attending: FAMILY MEDICINE
Payer: COMMERCIAL

## 2019-04-22 ENCOUNTER — TELEPHONE (OUTPATIENT)
Dept: URGENT CARE | Facility: URGENT CARE | Age: 39
End: 2019-04-22

## 2019-04-22 ENCOUNTER — OFFICE VISIT (OUTPATIENT)
Dept: URGENT CARE | Facility: URGENT CARE | Age: 39
End: 2019-04-22
Payer: COMMERCIAL

## 2019-04-22 VITALS
HEIGHT: 71 IN | BODY MASS INDEX: 32.34 KG/M2 | HEART RATE: 95 BPM | SYSTOLIC BLOOD PRESSURE: 140 MMHG | WEIGHT: 231 LBS | RESPIRATION RATE: 14 BRPM | DIASTOLIC BLOOD PRESSURE: 91 MMHG | TEMPERATURE: 98.5 F

## 2019-04-22 DIAGNOSIS — R30.0 DYSURIA: ICD-10-CM

## 2019-04-22 DIAGNOSIS — R30.0 DYSURIA: Primary | ICD-10-CM

## 2019-04-22 LAB
ALBUMIN UR-MCNC: ABNORMAL MG/DL
ANION GAP SERPL CALCULATED.3IONS-SCNC: 5 MMOL/L (ref 3–14)
APPEARANCE UR: CLEAR
BACTERIA #/AREA URNS HPF: ABNORMAL /HPF
BASOPHILS # BLD AUTO: 0 10E9/L (ref 0–0.2)
BASOPHILS NFR BLD AUTO: 0.3 %
BILIRUB UR QL STRIP: NEGATIVE
BUN SERPL-MCNC: 13 MG/DL (ref 7–30)
CALCIUM SERPL-MCNC: 9 MG/DL (ref 8.5–10.1)
CHLORIDE SERPL-SCNC: 109 MMOL/L (ref 94–109)
CO2 SERPL-SCNC: 27 MMOL/L (ref 20–32)
COLOR UR AUTO: YELLOW
CREAT SERPL-MCNC: 0.91 MG/DL (ref 0.66–1.25)
DIFFERENTIAL METHOD BLD: NORMAL
EOSINOPHIL # BLD AUTO: 0.2 10E9/L (ref 0–0.7)
EOSINOPHIL NFR BLD AUTO: 2.4 %
ERYTHROCYTE [DISTWIDTH] IN BLOOD BY AUTOMATED COUNT: 12 % (ref 10–15)
GFR SERPL CREATININE-BSD FRML MDRD: >90 ML/MIN/{1.73_M2}
GLUCOSE SERPL-MCNC: 95 MG/DL (ref 70–99)
GLUCOSE UR STRIP-MCNC: NEGATIVE MG/DL
HCT VFR BLD AUTO: 41.8 % (ref 40–53)
HGB BLD-MCNC: 14.9 G/DL (ref 13.3–17.7)
HGB UR QL STRIP: ABNORMAL
KETONES UR STRIP-MCNC: ABNORMAL MG/DL
LEUKOCYTE ESTERASE UR QL STRIP: NEGATIVE
LYMPHOCYTES # BLD AUTO: 2.4 10E9/L (ref 0.8–5.3)
LYMPHOCYTES NFR BLD AUTO: 27.7 %
MCH RBC QN AUTO: 31 PG (ref 26.5–33)
MCHC RBC AUTO-ENTMCNC: 35.6 G/DL (ref 31.5–36.5)
MCV RBC AUTO: 87 FL (ref 78–100)
MONOCYTES # BLD AUTO: 0.7 10E9/L (ref 0–1.3)
MONOCYTES NFR BLD AUTO: 8.1 %
NEUTROPHILS # BLD AUTO: 5.3 10E9/L (ref 1.6–8.3)
NEUTROPHILS NFR BLD AUTO: 61.5 %
NITRATE UR QL: NEGATIVE
PH UR STRIP: 5.5 PH (ref 5–7)
PLATELET # BLD AUTO: 251 10E9/L (ref 150–450)
POTASSIUM SERPL-SCNC: 3.5 MMOL/L (ref 3.4–5.3)
RBC # BLD AUTO: 4.8 10E12/L (ref 4.4–5.9)
RBC #/AREA URNS AUTO: >100 /HPF
SODIUM SERPL-SCNC: 141 MMOL/L (ref 133–144)
SOURCE: ABNORMAL
SP GR UR STRIP: 1.02 (ref 1–1.03)
UROBILINOGEN UR STRIP-ACNC: 0.2 EU/DL (ref 0.2–1)
WBC # BLD AUTO: 8.6 10E9/L (ref 4–11)
WBC #/AREA URNS AUTO: ABNORMAL /HPF

## 2019-04-22 PROCEDURE — 81001 URINALYSIS AUTO W/SCOPE: CPT | Performed by: PHYSICIAN ASSISTANT

## 2019-04-22 PROCEDURE — 99214 OFFICE O/P EST MOD 30 MIN: CPT | Performed by: FAMILY MEDICINE

## 2019-04-22 PROCEDURE — 74019 RADEX ABDOMEN 2 VIEWS: CPT

## 2019-04-22 PROCEDURE — 36415 COLL VENOUS BLD VENIPUNCTURE: CPT | Performed by: FAMILY MEDICINE

## 2019-04-22 PROCEDURE — 80048 BASIC METABOLIC PNL TOTAL CA: CPT | Performed by: FAMILY MEDICINE

## 2019-04-22 PROCEDURE — 87086 URINE CULTURE/COLONY COUNT: CPT | Performed by: FAMILY MEDICINE

## 2019-04-22 PROCEDURE — 85025 COMPLETE CBC W/AUTO DIFF WBC: CPT | Performed by: FAMILY MEDICINE

## 2019-04-22 RX ORDER — TAMSULOSIN HYDROCHLORIDE 0.4 MG/1
0.4 CAPSULE ORAL DAILY
Qty: 14 CAPSULE | Refills: 0 | Status: SHIPPED | OUTPATIENT
Start: 2019-04-22 | End: 2020-01-13

## 2019-04-22 ASSESSMENT — MIFFLIN-ST. JEOR: SCORE: 1987

## 2019-04-22 NOTE — PROGRESS NOTES
SUBJECTIVE:   David Crane is a 39 year old male who presents to clinic today for the following health issues:    Last night he began having pain in his lower abdomen and his side pain with moved to the front some discomfort with urination no fever no chills no significant flank back pain    His pain did get so bad that it was doubling up in a fetal position            HPI  Similar episodes to this 2 weeks ago that did pass.  Acute change in coloration of his urine  Additional history: as documented    Reviewed and updated as needed this visit by clinical staff  Tobacco  Allergies  Meds  Med Hx  Surg Hx  Fam Hx  Soc Hx        Reviewed and updated as needed this visit by Provider         Genitourinary symptoms      Duration: 2 days    Description:  dysuria    Intensity:  Severe abdominal pain    Accompanying signs and symptoms (fever/discharge/nausea/vomiting/back or abdominal pain): Abdominal and side pain    History (frequent UTI's/kidney stones/prostate problems): Episodes similar to this 2 weeks ago  Sexually active: YES    Precipitating or alleviating factors: None    Therapies tried and outcome: none   Outcome:       Patient Active Problem List   Diagnosis     Tobacco use disorder     MYOPIA [367.1]     Exotropia     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, episodic (H)     Past Surgical History:   Procedure Laterality Date     HC STRABISMUS SURG,TWO HORIZ MUSCLE  01/09/06    LT       Social History     Tobacco Use     Smoking status: Former Smoker     Packs/day: 0.25     Years: 9.00     Pack years: 2.25     Types: Cigarettes     Smokeless tobacco: Current User     Types: Snuff     Tobacco comment: 4-20  quit about 2 months ago   Substance Use Topics     Alcohol use: Not Currently     Family History   Problem Relation Age of Onset     Diabetes Maternal Grandmother      Cancer Maternal Grandmother         lung cancer,heavy smoker     Cerebrovascular Disease Maternal Grandfather       "Eye Disorder Maternal Aunt         strabismus     Heart Disease Paternal Grandfather         had major heart attack and      Eye Disorder Other         cousin- strabismus     Hypertension No family hx of      Breast Cancer No family hx of      Cancer - colorectal No family hx of      Prostate Cancer No family hx of      Lipids No family hx of      Neurologic Disorder No family hx of      Respiratory No family hx of      Thyroid Disease No family hx of            ROS:  Constitutional, HEENT, cardiovascular, pulmonary, gi and gu, endo, skin,  systems are negative, except as otherwise noted.      Psych  Review of chart did show opiate problems    OBJECTIVE:     BP (!) 140/91 (BP Location: Right arm, Patient Position: Chair, Cuff Size: Adult Large)   Pulse 95   Temp 98.5  F (36.9  C) (Oral)   Resp 14   Ht 1.791 m (5' 10.5\")   Wt 104.8 kg (231 lb)   BMI 32.68 kg/m    Body mass index is 32.68 kg/m .    GENERAL: alert and mild distress  NECK: no adenopathy, no asymmetry, masses, or scars and thyroid normal to palpation  RESP: lungs clear to auscultation - no rales, rhonchi or wheezes  CV: regular rate and rhythm, normal S1 S2, no S3 or S4, no murmur, click or rub, no peripheral edema and peripheral pulses strong  ABDOMEN: soft, nontender, no hepatosplenomegaly, no masses and bowel sounds normal  MS: no gross musculoskeletal defects noted, no edema    Diagnostic Test Results:  Labs are pending    X-ray was negative other than a moderate amount of stool but I could not see any evidence that suggest he had a stone on his x-ray      ASSESSMENT/PLAN:             1. Dysuria  I suspect by his hematuria by his pain that he has a stone in addition he had another significant episode like this in the past .  That is the reason that I do not think and that he has been infection.  - UA reflex to Microscopic and Culture  - Urine Microscopic  - CBC with platelets and differential  - Basic metabolic panel  - XR Abdomen 2 Views; " Future  - Urine Culture Aerobic Bacterial  - tamsulosin (FLOMAX) 0.4 MG capsule; Take 1 capsule (0.4 mg) by mouth daily  Dispense: 14 capsule; Refill: 0  - CT Abdomen Pelvis w/o Contrast; Future    We will use Flomax, I did review his chart and I did not give him pain medication because of previous problems with opioids.    We also set him up for a CT scan abdomen and pelvis without contrast stone protocol.    I encouraged him to be seen in 2 weeks I encouraged him to find a primary care to follow-up with    Also did discuss with him they will probably be calling him today with the CT scan and a time that he can be seen    In addition if his labs are abnormal with the BMP elevated creatinine he will need to be seen SHAYE Gonzalez MD  [facility]

## 2019-04-22 NOTE — TELEPHONE ENCOUNTER
Reason for Call:  Other call back    Detailed comments: The pt was calling and he needs a letter saying that he was seen in clinic today(He stated that nothing else needs to be in the letter). The letter when completed can be emailed to [email or web address].     Phone Number Patient can be reached at: Cell number on file:    Telephone Information:   Mobile [phone]       Best Time: Anytime    Can we leave a detailed message on this number? YES    Call taken on 4/22/2019 at 11:39 AM by Dang Delaney

## 2019-04-22 NOTE — TELEPHONE ENCOUNTER
Reason for Call:  Other call back    Detailed comments: Patient calling regarding the CT order. He doesn't want to go to [facility] to have it done he wants a referral/order so he can go to [facility] to have it done. Please call patient back on his cell regarding this.    Phone Number Patient can be reached at: Cell number on file:    Telephone Information:   Mobile [phone]       Best Time: any    Can we leave a detailed message on this number? YES    Call taken on 4/22/2019 at 5:01 PM by Neli Avendano

## 2019-04-23 LAB
BACTERIA SPEC CULT: NO GROWTH
SPECIMEN SOURCE: NORMAL

## 2019-04-23 NOTE — TELEPHONE ENCOUNTER
Please call patient to let him know that we can print out the order for him to  here, along with the note he requested, and he can call the radiology department at [facility] to schedule the CT at his convenience.

## 2019-04-23 NOTE — TELEPHONE ENCOUNTER
David was called and informed that order for CT and letter that he was seen in the clinic is available for  at [facility] .  Thea Stiles CMA ([facility])

## 2019-04-23 NOTE — TELEPHONE ENCOUNTER
David was called and was given message that order for Ct and letter can be picked up at [facility] .  Thea Stiles CMA ([facility])

## 2019-04-24 ENCOUNTER — NURSE TRIAGE (OUTPATIENT)
Dept: NURSING | Facility: CLINIC | Age: 39
End: 2019-04-24

## 2019-04-25 ENCOUNTER — HOSPITAL ENCOUNTER (EMERGENCY)
Facility: CLINIC | Age: 39
Discharge: HOME | End: 2019-04-25
Attending: EMERGENCY MEDICINE | Admitting: EMERGENCY MEDICINE
Payer: COMMERCIAL

## 2019-04-25 ENCOUNTER — APPOINTMENT (OUTPATIENT)
Dept: CT IMAGING | Facility: CLINIC | Age: 39
End: 2019-04-25
Attending: EMERGENCY MEDICINE
Payer: COMMERCIAL

## 2019-04-25 VITALS
OXYGEN SATURATION: 94 % | TEMPERATURE: 97.8 F | HEIGHT: 70 IN | WEIGHT: 231 LBS | BODY MASS INDEX: 33.07 KG/M2 | DIASTOLIC BLOOD PRESSURE: 106 MMHG | SYSTOLIC BLOOD PRESSURE: 172 MMHG

## 2019-04-25 DIAGNOSIS — R22.2 PLEURAL NODULE: ICD-10-CM

## 2019-04-25 DIAGNOSIS — N20.1 URETEROLITHIASIS: ICD-10-CM

## 2019-04-25 DIAGNOSIS — R03.0 ELEVATED BLOOD PRESSURE READING WITHOUT DIAGNOSIS OF HYPERTENSION: ICD-10-CM

## 2019-04-25 LAB
ALBUMIN SERPL-MCNC: 4 G/DL (ref 3.4–5)
ALBUMIN UR-MCNC: 10 MG/DL
ALP SERPL-CCNC: 99 U/L (ref 40–150)
ALT SERPL W P-5'-P-CCNC: 56 U/L (ref 0–70)
ANION GAP SERPL CALCULATED.3IONS-SCNC: 9 MMOL/L (ref 3–14)
APPEARANCE UR: CLEAR
AST SERPL W P-5'-P-CCNC: 20 U/L (ref 0–45)
BASOPHILS # BLD AUTO: 0 10E9/L (ref 0–0.2)
BASOPHILS NFR BLD AUTO: 0.4 %
BILIRUB SERPL-MCNC: 0.5 MG/DL (ref 0.2–1.3)
BILIRUB UR QL STRIP: NEGATIVE
BUN SERPL-MCNC: 15 MG/DL (ref 7–30)
CALCIUM SERPL-MCNC: 8.7 MG/DL (ref 8.5–10.1)
CHLORIDE SERPL-SCNC: 107 MMOL/L (ref 94–109)
CO2 SERPL-SCNC: 25 MMOL/L (ref 20–32)
COLOR UR AUTO: YELLOW
CREAT SERPL-MCNC: 0.88 MG/DL (ref 0.66–1.25)
DIFFERENTIAL METHOD BLD: NORMAL
EOSINOPHIL # BLD AUTO: 0.1 10E9/L (ref 0–0.7)
EOSINOPHIL NFR BLD AUTO: 1.9 %
ERYTHROCYTE [DISTWIDTH] IN BLOOD BY AUTOMATED COUNT: 11.8 % (ref 10–15)
GFR SERPL CREATININE-BSD FRML MDRD: >90 ML/MIN/{1.73_M2}
GLUCOSE SERPL-MCNC: 111 MG/DL (ref 70–99)
GLUCOSE UR STRIP-MCNC: NEGATIVE MG/DL
HCT VFR BLD AUTO: 40.6 % (ref 40–53)
HGB BLD-MCNC: 14.7 G/DL (ref 13.3–17.7)
HGB UR QL STRIP: ABNORMAL
IMM GRANULOCYTES # BLD: 0 10E9/L (ref 0–0.4)
IMM GRANULOCYTES NFR BLD: 0.1 %
INTERPRETATION ECG - MUSE: NORMAL
KETONES UR STRIP-MCNC: NEGATIVE MG/DL
LEUKOCYTE ESTERASE UR QL STRIP: NEGATIVE
LIPASE SERPL-CCNC: 274 U/L (ref 73–393)
LYMPHOCYTES # BLD AUTO: 2.6 10E9/L (ref 0.8–5.3)
LYMPHOCYTES NFR BLD AUTO: 36.7 %
MCH RBC QN AUTO: 31.1 PG (ref 26.5–33)
MCHC RBC AUTO-ENTMCNC: 36.2 G/DL (ref 31.5–36.5)
MCV RBC AUTO: 86 FL (ref 78–100)
MONOCYTES # BLD AUTO: 0.6 10E9/L (ref 0–1.3)
MONOCYTES NFR BLD AUTO: 8.6 %
MUCOUS THREADS #/AREA URNS LPF: PRESENT /LPF
NEUTROPHILS # BLD AUTO: 3.8 10E9/L (ref 1.6–8.3)
NEUTROPHILS NFR BLD AUTO: 52.3 %
NITRATE UR QL: NEGATIVE
NRBC # BLD AUTO: 0 10*3/UL
NRBC BLD AUTO-RTO: 0 /100
PH UR STRIP: 6 PH (ref 5–7)
PLATELET # BLD AUTO: 211 10E9/L (ref 150–450)
POTASSIUM SERPL-SCNC: 3.3 MMOL/L (ref 3.4–5.3)
PROT SERPL-MCNC: 7.5 G/DL (ref 6.8–8.8)
RBC # BLD AUTO: 4.72 10E12/L (ref 4.4–5.9)
RBC #/AREA URNS AUTO: 135 /HPF (ref 0–2)
SODIUM SERPL-SCNC: 141 MMOL/L (ref 133–144)
SOURCE: ABNORMAL
SP GR UR STRIP: 1.02 (ref 1–1.03)
SPERM #/AREA URNS HPF: PRESENT /HPF
UROBILINOGEN UR STRIP-MCNC: NORMAL MG/DL (ref 0–2)
WBC # BLD AUTO: 7.4 10E9/L (ref 4–11)
WBC #/AREA URNS AUTO: 3 /HPF (ref 0–5)

## 2019-04-25 PROCEDURE — 80053 COMPREHEN METABOLIC PANEL: CPT | Performed by: EMERGENCY MEDICINE

## 2019-04-25 PROCEDURE — 83690 ASSAY OF LIPASE: CPT | Performed by: EMERGENCY MEDICINE

## 2019-04-25 PROCEDURE — 93005 ELECTROCARDIOGRAM TRACING: CPT

## 2019-04-25 PROCEDURE — 99285 EMERGENCY DEPT VISIT HI MDM: CPT | Mod: 25

## 2019-04-25 PROCEDURE — 74176 CT ABD & PELVIS W/O CONTRAST: CPT

## 2019-04-25 PROCEDURE — 81001 URINALYSIS AUTO W/SCOPE: CPT | Performed by: EMERGENCY MEDICINE

## 2019-04-25 PROCEDURE — 85025 COMPLETE CBC W/AUTO DIFF WBC: CPT | Performed by: EMERGENCY MEDICINE

## 2019-04-25 ASSESSMENT — ENCOUNTER SYMPTOMS
VOMITING: 0
DYSURIA: 1
NAUSEA: 0
APPETITE CHANGE: 1
FEVER: 0
DIARRHEA: 0
ABDOMINAL PAIN: 1
CONSTIPATION: 0
ABDOMINAL DISTENTION: 1

## 2019-04-25 ASSESSMENT — MIFFLIN-ST. JEOR: SCORE: 1979.06

## 2019-04-25 NOTE — DISCHARGE INSTRUCTIONS
*You may resume diet and activities.  Drink plenty of fluids.  *Continue your current medications including Flomax.    *Follow-up to establish care with a primary care doctor as directed.  A referral to the lung nodule clinic was made for you and you should receive a call.  *Return if you develop fever, are unable to urinate, cannot keep fluids down, or become worse in any way.    Discharge Instructions  Kidney Stones    Kidney stones are a common problem that can cause a lot of pain but fortunately are usually not dangerous and can be generally treated with medicine at home.  However, sometimes your condition may be worse than it seemed at first, or may get worse with time.     You need to follow-up with your regular doctor within 3 days.    Most kidney stones will pass on their own, but occasionally stones may need to be removed by an urologist. We will send you home with a urine strainer. Be sure to urinate into this, or urinate into a container and pour the urine through the fine filter to catch the kidney stone as it comes out. The stone will seem like a pebble or grain of sand. Be sure to save this in a zip-lock bag and take it to the doctor?s office with you.       Return to the Emergency Department if:  Your pain is not controlled.  You are vomiting and can?t keep fluids or medications down.  You develop fever (>101)  You feel much more ill or develop new symptoms  What can I do to help myself?  Be sure to drink plenty of fluids  Staying active is good, and may help the stone to pass. You may do whatever you feel up to doing without restrictions.   Treatment:  Non-steroidal anti-inflammatory drugs (NSAIDs). This includes prescription medicines like Toradol   and non-prescription medicines like ibuprofen (Advil , Nuprin  ). These pain relievers are very effective for kidney stones.  Narcotic pain pills. If you have been given a narcotic (such as codeine, hydrocodone, or oxycodone) do not drive for four hours  after you have taken it. If the narcotic contains acetaminophen (Tylenol), do not take Tylenol with it. All narcotics will cause constipation, so eat a high fiber diet.    Nausea medication.  Nausea and vomiting are common with kidney stones, so your physician may send you home with medicine for this.   Flomax (Tamsulosin). This medicine is sometimes used for men with prostate problems, but also can help kidney stones to pass. This medicine can lower blood pressure, and you may feel faint, especially when you first stand up. Be sure to get up gradually, sit down if you feel faint, and avoid activity where feeling faint would be dangerous, such as climbing ladders.     Remember that you can always come back to the Emergency Department if you are not able to see your regular doctor in the amount of time listed above, if you get any new symptoms, or if there is anything that worries you.      Opioid Medication Information    You have been given a prescription for an opioid (narcotic) pain medicine and/or have received a pain medicine while here in the Emergency Department. These medicines can make you drowsy or impaired. You must not drive, operate dangerous equipment, or engage in any other dangerous activities while taking these medications. If you drive while taking these medications, you could be arrested for DUI, or driving under the influence. Do not drink any alcohol while you are taking these medications.   Opioid pain medications can cause addiction. If you have a history of chemical dependency of any type, you are at a higher risk of becoming addicted to pain medications.  Only take these prescribed medications to treat your pain when all other options have been tried. Take it for as short a time and as few doses as possible. Store your pain pills in a secure place, as they are frequently stolen and provide a dangerous opportunity for children or visitors in your house to start abusing these powerful  medications. We will not replace any lost or stolen medicine.  As soon as your pain is better, you should flush all your remaining medication.   Many prescription pain medications contain Tylenol  (acetaminophen), including Vicodin , Tylenol #3 , Norco , Lortab , and Percocet .  You should not take any extra pills of Tylenol  if you are using these prescription medications or you can get very sick.  Do not ever take more than 4000 mg of acetaminophen in any 24 hour period.  All opioids tend to cause constipation. Drink plenty of water and eat foods that have a lot of fiber, such as fruits, vegetables, prune juice, apple juice and high fiber cereal.  Take a laxative if you don?t move your bowels at least every other day. Miralax , Milk of Magnesia, Colace , or Senna  can be used to keep you regular.        Discharge Instructions  Hypertension - High Blood Pressure    During you visit to the Emergency Department, your blood pressure was higher than the recommended blood pressure.  This may be related to stress, pain, medication or other temporary conditions. In these cases, your blood pressure may return to normal on its own. If you have a history of high blood pressure, you may need to have your provider adjust your medications. Sometimes, your high measurement here may indicate that you have developed high blood pressure that will stay high unless it is treated. As a general rule, high blood pressure causes problems over years rather than days, weeks, or months. So, while it is important to treat blood pressure, it is rarely important to treat blood pressure immediately. Occasionally we will begin a medication in the Emergency Department; more often we will recommend close follow-up for medications with a primary doctor/clinic.    Generally, every Emergency Department visit should have a follow-up clinic visit with either a primary or a specialty clinic/provider. Please follow-up as instructed by your emergency  provider today.    Return to the Emergency Department if you start to have:  A severe headache.  Chest pain.  Shortness of breath.  Weakness or numbness that affects one part of the body.  Confusion.  Vision changes.  Significant swelling of legs and/or eyes.  A reaction to any medication started in the Emergency Department.    What can I do to help myself?  Avoid alcohol.  Take any blood pressure medicine that you are prescribed.  Get a good night?s sleep.  Lower your salt intake.  Exercise.  Lose weight.  Manage stress.  See your doctor regularly    If blood pressure medication was started in the Emergency Department:  The medicine may not have an immediate effect. The body and brain determine what blood pressure you have. The medicine?s job is to retrain the body?s ?thermostat? to a lower blood pressure.  You will need to follow up with your provider to see how this medicine is working for you.  If you were given a prescription for medicine here today, be sure to read all of the information (including the package insert) that comes with your prescription.  This will include important information about the medicine, its side effects, and any warnings that you need to know about.  The pharmacist who fills the prescription can provide more information and answer questions you may have about the medicine.  If you have questions or concerns that the pharmacist cannot address, please call or return to the Emergency Department.   Remember that you can always come back to the Emergency Department if you are not able to see your regular provider in the amount of time listed above, if you get any new symptoms, or if there is anything that worries you.

## 2019-04-25 NOTE — ED PROVIDER NOTES
History     Chief Complaint:  Abdominal Pain     HPI   David Crane is a 39 year old male who presents with abdominal pain. The patient reports that he passed a kidney stone four days ago after being evaluated at [facility]. X-ray was performed and CT ordered for 4/25, discharged with Flomax. He reports extreme left flank pain until he passed the kidney stone and has had a burning sensation with urination. He still has this burning sensation. Today, the patient reports that he began to experience abdominal pain from his epigastric area down to his lower abdomen. The abdominal pain is associated with shortness of breath due to the sensation of bloating keeping him from taking a deep breath. Occasionally the patient will experience increased pressure in his lower abdomen bilaterally. The patient denies fevers, nausea, vomiting, diarrhea, or constipation. Of note, the patient states that he has not had an appetite lately. The patient has never had surgery on his abdomen. He does not drink or smoke.     Allergies:  The patient has no known drug allergies.     Medications:    Flonase  Flomax     Past Medical History:    Other, mixed, or unspecified drug abuse, in remission.  Kidney stones    Past Surgical History:    Strabismus surgery, two horizontal muscles, left    Family History:    No past pertinent family history.    Social History:  Former smoker. Quit about two months ago.  Negative for alcohol use.  Marital Status:   [2]     Review of Systems   Constitutional: Positive for appetite change. Negative for fever.   Gastrointestinal: Positive for abdominal distention and abdominal pain. Negative for constipation, diarrhea, nausea and vomiting.   Genitourinary: Positive for dysuria.   All other systems reviewed and are negative.      Physical Exam     Patient Vitals for the past 24 hrs:   BP Temp Temp src Heart Rate SpO2 Height Weight   04/25/19 0018 (!) 172/106 97.8  F (36.6  C) Oral 90 94 % 1.778  "m (5' 10\") 104.8 kg (231 lb)     Physical Exam  General: Well-nourished, appears to be resting comfortably when I enter the room  Eyes: PERRL, conjunctivae pink no scleral icterus or conjunctival injection  ENT:  Moist mucus membranes, posterior oropharynx clear without erythema or exudates  Respiratory:  Lungs clear to auscultation bilaterally, no crackles/rubs/wheezes.  Good air movement  CV: Normal rate and rhythm, no murmurs/rubs/gallops  GI:  Abdomen soft and non-distended.  Normoactive BS.  Mild suprapubic discomfort with palpation, no guarding or rebound  Skin: Warm, dry.  No rashes or petechiae  Musculoskeletal: No peripheral edema or calf tenderness  Neuro: Alert and oriented to person/place/time  Psychiatric: Normal affect    Emergency Department Course   ECG (02:21:19):  Rate 75 bpm. NH interval 186. QRS duration 92. QT/QTc 374/417. P-R-T axes 37 7 35. Normal sinus rhythm, Normal ECG, No significant change compared to EKG dated 9/4/18 Interpreted at 0225 by Trinidad Hinojosa MD    Imaging:  Radiographic findings were communicated with the patient who voiced understanding of the findings.    Abd/pelvis CT no contrast  IMPRESSION:  1. 0.4 cm distal left ureteral calculus at the ureterovesicular  junction, resulting in slight obstruction.  2. No additional renal or ureteral calculi.  3. 3 cm smoothly contoured pleural-based soft tissue structure in the  lower right hemithorax. This is nonspecific, but could represent a  solitary fibrous tumor of the pleura  As read by radiology     Laboratory:  UA: blood moderate, Protein albumin 10, , Mucous present, Sperm present, o/w negative    Lipase: 274  CMP: Potassium 3.3, Glucose 111, (Creatinine 0.88)  CBC: WNL (WBC 7.4, HGB 14.7, )    Emergency Department Course:  Past medical records, nursing notes, and vitals reviewed.  0022: I performed an exam of the patient and obtained history, as documented above.    IV inserted. Medicine administered as documented " above. Blood drawn. This was sent to the lab for further testing, results above.     The patient was sent for a abd/pelvis CT no contrast while in the emergency department, findings above.     0212: Findings and plan explained to the Patient. Patient discharged home with instructions regarding supportive care, medications, and reasons to return. The importance of close follow-up was reviewed.    Impression & Plan      Medical Decision Making:  Mr. Crane presented with abdominal pain and a history of flank pain that is consistent with renal colic. His labs are reassuring.  CT confirms a ureteral stone and no other cause for his pain and discomfort. There is no fever or evidence of a urinary tract infection. The patient will be discharged to continue Flomax in an attempt to ease stone passage.   Zofran was prescribed for nausea.  I considered other etiologies for these symptoms including AAA and pyelonephritis but these are unlikely given the otherwise normal CT scan and urinalysis.  The patient is instructed to return if increasing pain not controlled, vomiting, and fever. Strain urine to look for stone, if detected, submit to primary doctor for lab analysis. Instructions were given to follow up with urology within one week, sooner if pain continues, as retrieval of the stone may be required for refractory symptoms.    An incidental finding of a pleural based mass was noted on imaging.  I alerted the patient, gave copies of lab results and urged follow-up with PMD for further testing and evaluation.  His blood pressure has also been elevated and so I did recommend that he follow-up with a primary doctor to establish continuity of care for these reasons.     Diagnosis:    ICD-10-CM    1. Ureterolithiasis N20.1    2. Pleural nodule R22.2 Lung Nodule Program Referral Location: [facility] - [phone]   3. Elevated blood pressure reading without diagnosis of hypertension R03.0         Disposition:  discharged to home    Parker Hernándezstephie   4/25/2019    EMERGENCY DEPARTMENT    Scribe Disclosure:  I, Parker Asya, am serving as a scribe at 12:22 AM on 4/25/2019 to document services personally performed by Trinidad Hinojosa MD based on my observations and the provider's statements to me.          Trinidad Hinojosa MD  04/26/19 8285

## 2019-04-25 NOTE — TELEPHONE ENCOUNTER
"38 y/o male calls about kidney stones, has CT scan in the AM - has lower abdominal discomfort off and on related to the kidney stones but call tonight because he is having what feels like pressure there, feeling like he is bloated,no swelling in legs or ankles. He describes moderate shortness of breath, difficult to take a deep breath although not painful, denies fever.    RN reviewed protocols, advised the recommendation is immediate evaluation in the nest 3-4 hours which means the ER, go to the ER for evaluation, he is going now.    Bhakti Bauer RN - [place] Nurse Advisor  4/24/2019    Reason for Disposition    [1] MILD difficulty breathing (e.g., minimal/no SOB at rest, SOB with walking, pulse <100) AND [2] NEW-onset or WORSE than normal    Additional Information    Negative: [1] MODERATE difficulty breathing (e.g., speaks in phrases, SOB even at rest, pulse 100-120) AND [2] NEW-onset or WORSE than normal    Negative: Wheezing can be heard across the room    Negative: Drooling or spitting out saliva (because can't swallow)    Negative: [1] Breathing stopped AND [2] hasn't returned    Negative: Choking on something    Negative: Severe difficulty breathing (e.g., struggling for each breath, speaks in single words)    Negative: Bluish lips, tongue, or face now    Negative: Difficult to awaken or acting confused  (e.g., disoriented, slurred speech)    Negative: Passed out (i.e., lost consciousness, collapsed and was not responding)    Negative: Wheezing started suddenly after medicine, an allergic food or bee sting    Negative: Stridor    Negative: Slow, shallow and weak breathing    Negative: Sounds like a life-threatening emergency to the triager    Negative: History of prior \"blood clot\" in leg or lungs (i.e., deep vein thrombosis, pulmonary embolism)    Negative: History of inherited increased risk of blood clots (e.g., Factor 5 Leiden, Anti-thrombin 3, Protein C or Protein S deficiency, Prothrombin mutation)    " "Negative: Recent illness requiring prolonged bedrest (i.e., immobilization)    Negative: Recent long-distance travel with prolonged time in car, bus, plane, or train (i.e., within past 2 weeks; 6 or  more hours duration)    Negative: Hip or leg fracture in past 2 months (e.g., had cast on leg or ankle)    Negative: Major surgery in the past month    Negative: Extra heart beats OR irregular heart beating   (i.e., \"palpitations\")    Negative: Fever > 103 F (39.4 C)    Negative: [1] Fever > 101 F (38.3 C) AND [2] age > 60    Negative: [1] Fever > 101 F (38.3 C) AND [2] bedridden (e.g., nursing home patient, CVA, chronic illness, recovering from surgery)    Negative: [1] Fever > 100.5 F (38.1 C) AND [2] diabetes mellitus or weak immune system (e.g., HIV positive, cancer chemo, splenectomy, organ transplant, chronic steroids)    Negative: [1] Periods where breathing stops and then resumes normally AND [2] bedridden (e.g., nursing home patient, CVA)    Negative: Pregnant or postpartum (< 1 month since delivery)    Negative: Patient sounds very sick or weak to the triager    Protocols used: BREATHING DIFFICULTY-ADULT-AH      "

## 2019-04-25 NOTE — ED AVS SNAPSHOT
Emergency Department  [address] [place] [id number]  Phone:  [phone]  Fax:  [phone]                                    David Crane   MRN: [id number]    Department:   Emergency Department   Date of Visit:  4/25/2019           After Visit Summary Signature Page    I have received my discharge instructions, and my questions have been answered. I have discussed any challenges I see with this plan with the nurse or doctor.    ..........................................................................................................................................  Patient/Patient Representative Signature      ..........................................................................................................................................  Patient Representative Print Name and Relationship to Patient    ..................................................               ................................................  Date                                   Time    ..........................................................................................................................................  Reviewed by Signature/Title    ...................................................              ..............................................  Date                                               Time          22EPIC Rev 08/18

## 2019-04-30 NOTE — TELEPHONE ENCOUNTER
ONCOLOGY INTAKE: Records Information      APPT INFORMATION:  Referring provider:  Trinidad Hinojosa  Referring provider s clinic: [facility]  Reason for visit/diagnosis:  Lung Nodules    RECORDS INFORMATION:  Were the records received with the referral (via [employer])? No    Has patient been seen for any external appt for this diagnosis? No    If yes, where?n/a    Has patient had any imaging or procedures outside of [facility] for this condition? No      If Yes, where? n/a    ADDITIONAL INFORMATION:  none

## 2019-05-01 NOTE — TELEPHONE ENCOUNTER
RECORDS STATUS - ALL OTHER DIAGNOSIS      RECORDS RECEIVED FROM: [facility]   DATE RECEIVED: 5/1/19   NOTES STATUS DETAILS   OFFICE NOTE from referring provider  [facility]   OFFICE NOTE from medical oncologist     DISCHARGE SUMMARY from hospital     DISCHARGE REPORT from the ER 4/25/19 [facility]   OPERATIVE REPORT     MEDICATION LIST  [facility]   CLINICAL TRIAL TREATMENTS TO DATE     LABS     PATHOLOGY REPORTS     ANYTHING RELATED TO DIAGNOSIS  Epic   GENONOMIC TESTING     TYPE:     IMAGING (NEED IMAGES & REPORT)     CT SCANS 4/25/19 PACS   MRI     MAMMO     ULTRASOUND     PET

## 2019-05-16 ENCOUNTER — HOSPITAL ENCOUNTER (OUTPATIENT)
Dept: CT IMAGING | Facility: CLINIC | Age: 39
Discharge: HOME | End: 2019-05-16
Attending: INTERNAL MEDICINE | Admitting: INTERNAL MEDICINE
Payer: COMMERCIAL

## 2019-05-16 ENCOUNTER — ONCOLOGY VISIT (OUTPATIENT)
Dept: ONCOLOGY | Facility: CLINIC | Age: 39
End: 2019-05-16
Attending: EMERGENCY MEDICINE
Payer: COMMERCIAL

## 2019-05-16 ENCOUNTER — PRE VISIT (OUTPATIENT)
Dept: ONCOLOGY | Facility: CLINIC | Age: 39
End: 2019-05-16

## 2019-05-16 VITALS
BODY MASS INDEX: 33.27 KG/M2 | HEIGHT: 70 IN | OXYGEN SATURATION: 98 % | SYSTOLIC BLOOD PRESSURE: 132 MMHG | DIASTOLIC BLOOD PRESSURE: 91 MMHG | TEMPERATURE: 97.8 F | WEIGHT: 232.4 LBS | HEART RATE: 87 BPM

## 2019-05-16 DIAGNOSIS — R91.8 PULMONARY NODULES: Primary | ICD-10-CM

## 2019-05-16 DIAGNOSIS — R91.8 PULMONARY NODULES: ICD-10-CM

## 2019-05-16 PROCEDURE — 25000125 ZZHC RX 250: Performed by: INTERNAL MEDICINE

## 2019-05-16 PROCEDURE — G0463 HOSPITAL OUTPT CLINIC VISIT: HCPCS

## 2019-05-16 PROCEDURE — 99204 OFFICE O/P NEW MOD 45 MIN: CPT | Performed by: INTERNAL MEDICINE

## 2019-05-16 PROCEDURE — 25000128 H RX IP 250 OP 636: Performed by: INTERNAL MEDICINE

## 2019-05-16 PROCEDURE — 71260 CT THORAX DX C+: CPT

## 2019-05-16 RX ORDER — IOPAMIDOL 755 MG/ML
80 INJECTION, SOLUTION INTRAVASCULAR ONCE
Status: COMPLETED | OUTPATIENT
Start: 2019-05-16 | End: 2019-05-16

## 2019-05-16 RX ADMIN — SODIUM CHLORIDE 70 ML: 9 INJECTION, SOLUTION INTRAVENOUS at 15:08

## 2019-05-16 RX ADMIN — IOPAMIDOL 80 ML: 755 INJECTION, SOLUTION INTRAVENOUS at 15:08

## 2019-05-16 ASSESSMENT — PAIN SCALES - GENERAL: PAINLEVEL: NO PAIN (0)

## 2019-05-16 ASSESSMENT — MIFFLIN-ST. JEOR: SCORE: 1985.41

## 2019-05-16 NOTE — TELEPHONE ENCOUNTER
RECORDS STATUS - ALL OTHER DIAGNOSIS      RECORDS RECEIVED FROM: COMPLETE - SEE PRE VISIT FOR ANY DRAKE 5/16/19   DATE RECEIVED:    NOTES STATUS DETAILS   OFFICE NOTE from referring provider Brittanie Drake MD Epic   OFFICE NOTE from medical oncologist     DISCHARGE SUMMARY from hospital     DISCHARGE REPORT from the ER     OPERATIVE REPORT     MEDICATION LIST     CLINICAL TRIAL TREATMENTS TO DATE     LABS     PATHOLOGY REPORTS     ANYTHING RELATED TO DIAGNOSIS     GENONOMIC TESTING     TYPE:     IMAGING (NEED IMAGES & REPORT)     CT SCANS 5/16/19: In Process    MRI     MAMMO     ULTRASOUND     PET

## 2019-05-16 NOTE — LETTER
5/16/2019         RE: David Crane  [address]  [facility] [id number]        Dear Colleague,    Thank you for referring your patient, David Crane, to the [facility]. Please see a copy of my visit note below.    This clinic visit note has been dictated   [id number]              [facility] PHYSICIANS  HEMATOLOGY ONCOLOGY    Visit Date:   05/16/2019      REASON FOR CONSULTATION:  Right-sided pleural-based nodule.      REFERRING PROVIDER:  Trinidad Hinojosa MD      HISTORY OF PRESENT ILLNESS:  The patient is a 39-year-old gentleman who was recently seen in the emergency room for kidney stones.  He had a CT scan of the abdomen and pelvis performed 04/25/2019.  It showed kidney stones.  There was a 3 cm mass in the right lower hemithorax.  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  There was also mild nonspecific enlargement of the gastrohepatic ligament lymph nodes near the GE junction.  His last chest imaging was in 2004.  I could not find a day resolved in the system.  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  No significant symptoms.      PAST MEDICAL HISTORY:  Kidney stones.      MEDICATIONS:  Reviewed.      ALLERGIES:  REVIEWED.      SOCIAL HISTORY:  He works at [employer].  Quit tobacco smoking 5 years ago.  He smoked for 15 years.  He is  and is currently getting .  He has 1 child.      FAMILY HISTORY:  Maternal grandmother at the age of 55, had lung cancer.  She was a smoker and an uncle in his 40s, had lung cancer.      REVIEW OF SYSTEMS:   A complete review of systems was performed and found to be negative other than pertinent positives mentioned in history of present illness.      PHYSICAL EXAMINATION:   VITAL SIGNS:  Blood pressure is 132/91, pulse 87, temperature 97.8.  CONSTITUTIONAL: Sitting comfortably.   HEENT: Pupils are equal. Oropharynx is clear.   NECK: No cervical or  supraclavicular lymphadenopathy.   RESPIRATORY: Clear bilaterally.   CARD/VASC: S1, S2, regular.   GI: Soft, nontender, nondistended, no hepatosplenomegaly.   MUSKULOSKELETAL: Warm, well perfused.   NEUROLOGIC: Alert, awake.   INTEGUMENT: No rash.   LYMPHATICS: No edema.   PSYCH: He is quite anxious.      LABORATORY DATA AND IMAGING REVIEWED DURING THIS VISIT:  Recent Labs   Lab Test 04/25/19  0028 04/22/19  1014    141   POTASSIUM 3.3* 3.5   CHLORIDE 107 109   CO2 25 27   ANIONGAP 9 5   BUN 15 13   CR 0.88 0.91   * 95   HILARIO 8.7 9.0     Recent Labs   Lab Test 04/25/19  0028 04/22/19  1014 09/03/18  1134   WBC 7.4 8.6 11.0   HGB 14.7 14.9 15.5    251 280   MCV 86 87 87   NEUTROPHIL 52.3 61.5 67.3     Recent Labs   Lab Test 04/25/19  0028 09/03/18  1134   BILITOTAL 0.5 1.0   ALKPHOS 99 83   ALT 56 62   AST 20 76*   ALBUMIN 4.0 4.3         Results for orders placed or performed during the hospital encounter of 04/25/19   Abd/pelvis CT no contrast - Stone Protocol    Narrative    CT ABDOMEN AND PELVIS WITHOUT CONTRAST  4/25/2019 1:03 AM     HISTORY: Abdominal distension. Flank pain.    COMPARISON: None.    TECHNIQUE: Without intravenous or oral contrast, helical sections were  acquired from the top of the diaphragm through the pubic symphysis.  Coronal reconstructions were generated. Radiation dose for this scan  was reduced using automated exposure control, adjustment of the mA  and/or kV according to the patient's size, or iterative reconstruction  technique. (Renal stone protocol).    FINDINGS:  Right urinary tract: No renal or ureteral calculi. No dilatation of  the intrarenal collecting system or ureter.    Left urinary tract: 0.4 cm calculus in the most distal aspect of the  ureter at the ureterovesicular junction. Slight dilatation of the  intrarenal collecting system and ureter. No additional renal or  ureteral calculi.    Urinary bladder: No additional calculi are visualized.    Remainder of  the abdomen and pelvis: The liver, spleen, pancreas and  adrenal glands are unremarkable to the limits of a noncontrast CT  scan. The gallbladder is present. The small and large bowel are normal  in caliber. The appendix is unremarkable. No bowel wall thickening,  pneumatosis or free intraperitoneal gas. Nonspecific mildly enlarged  2.2 x 1.2 cm gastrohepatic ligament lymph node (series 2 image 19),  near the gastroesophageal junction.    Scan through the lower chest is significant for a smoothly-contoured  3.4 x 1.8 cm pleural-based soft tissue attenuation structure in the  anterior aspect of the lower right hemithorax (series 2 image 7). No  free fluid in the abdomen or pelvis. Atherosclerotic calcification in  the abdominal aorta.      Impression    IMPRESSION:  1. 0.4 cm distal left ureteral calculus at the ureterovesicular  junction, resulting in slight obstruction.  2. No additional renal or ureteral calculi.  3. 3 cm smoothly-contoured pleural-based soft tissue structure in the  lower right hemithorax. 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    EMELY HUDDLESTON MD       ASSESSMENT AND RECOMMENDATIONS:  This 39-year-old gentleman who has a right-sided close to 3 cm pleural-based nodule, which was discovered incidentally on a CT of the abdomen and pelvis which was done for kidney stones.  He also has a mildly enlarged 2.2 cm gastrohepatic ligament lymph node.  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  A paternal grandmother at the age of 65, had lung cancer and a paternal uncle in his 40s, had lung cancer as well.  This person has a personal history of tobacco smoking for 15 years, which he quit 5 years ago.      I will have him get a CT scan of the chest with contrast.  I discussed that I would like him to be seen by Interventional pulmonary by Dr. Tatianna Nazario.  We will help him schedule that appointment for  workup of this lung mass.         BRITTANIE DRAKE MD             D: 2019   T: 2019   MT: YOVANNY      Name:     LEO HEBERT   MRN:      [id number]        Account:      [id number]   :      1980           Visit Date:   2019      Document: [id number]       cc: Tatianna Nazario MD       Again, thank you for allowing me to participate in the care of your patient.        Sincerely,        Brittanie Drake MD

## 2019-05-16 NOTE — PATIENT INSTRUCTIONS
1- CT scan chest with contrast  2- Schedule an appointment with Dr. Nazario in next 2 weeks or so      MD released the patient

## 2019-05-17 NOTE — PROGRESS NOTES
[facility] PHYSICIANS  HEMATOLOGY ONCOLOGY    Visit Date:   05/16/2019      REASON FOR CONSULTATION:  Right-sided pleural-based nodule.      REFERRING PROVIDER:  Trinidad Hinojosa MD      HISTORY OF PRESENT ILLNESS:  The patient is a 39-year-old gentleman who was recently seen in the emergency room for kidney stones.  He had a CT scan of the abdomen and pelvis performed 04/25/2019.  It showed kidney stones.  There was a 3 cm mass in the right lower hemithorax.  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  There was also mild nonspecific enlargement of the gastrohepatic ligament lymph nodes near the GE junction.  His last chest imaging was in 2004.  I could not find a day resolved in the system.  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  No significant symptoms.      PAST MEDICAL HISTORY:  Kidney stones.      MEDICATIONS:  Reviewed.      ALLERGIES:  REVIEWED.      SOCIAL HISTORY:  He works at [employer].  Quit tobacco smoking 5 years ago.  He smoked for 15 years.  He is  and is currently getting .  He has 1 child.      FAMILY HISTORY:  Maternal grandmother at the age of 55, had lung cancer.  She was a smoker and an uncle in his 40s, had lung cancer.      REVIEW OF SYSTEMS:   A complete review of systems was performed and found to be negative other than pertinent positives mentioned in history of present illness.      PHYSICAL EXAMINATION:   VITAL SIGNS:  Blood pressure is 132/91, pulse 87, temperature 97.8.  CONSTITUTIONAL: Sitting comfortably.   HEENT: Pupils are equal. Oropharynx is clear.   NECK: No cervical or supraclavicular lymphadenopathy.   RESPIRATORY: Clear bilaterally.   CARD/VASC: S1, S2, regular.   GI: Soft, nontender, nondistended, no hepatosplenomegaly.   MUSKULOSKELETAL: Warm, well perfused.   NEUROLOGIC: Alert, awake.   INTEGUMENT: No rash.   LYMPHATICS: No edema.   PSYCH: He is quite anxious.      LABORATORY  DATA AND IMAGING REVIEWED DURING THIS VISIT:  Recent Labs   Lab Test 04/25/19  0028 04/22/19  1014    141   POTASSIUM 3.3* 3.5   CHLORIDE 107 109   CO2 25 27   ANIONGAP 9 5   BUN 15 13   CR 0.88 0.91   * 95   HILARIO 8.7 9.0     Recent Labs   Lab Test 04/25/19  0028 04/22/19  1014 09/03/18  1134   WBC 7.4 8.6 11.0   HGB 14.7 14.9 15.5    251 280   MCV 86 87 87   NEUTROPHIL 52.3 61.5 67.3     Recent Labs   Lab Test 04/25/19  0028 09/03/18  1134   BILITOTAL 0.5 1.0   ALKPHOS 99 83   ALT 56 62   AST 20 76*   ALBUMIN 4.0 4.3         Results for orders placed or performed during the hospital encounter of 04/25/19   Abd/pelvis CT no contrast - Stone Protocol    Narrative    CT ABDOMEN AND PELVIS WITHOUT CONTRAST  4/25/2019 1:03 AM     HISTORY: Abdominal distension. Flank pain.    COMPARISON: None.    TECHNIQUE: Without intravenous or oral contrast, helical sections were  acquired from the top of the diaphragm through the pubic symphysis.  Coronal reconstructions were generated. Radiation dose for this scan  was reduced using automated exposure control, adjustment of the mA  and/or kV according to the patient's size, or iterative reconstruction  technique. (Renal stone protocol).    FINDINGS:  Right urinary tract: No renal or ureteral calculi. No dilatation of  the intrarenal collecting system or ureter.    Left urinary tract: 0.4 cm calculus in the most distal aspect of the  ureter at the ureterovesicular junction. Slight dilatation of the  intrarenal collecting system and ureter. No additional renal or  ureteral calculi.    Urinary bladder: No additional calculi are visualized.    Remainder of the abdomen and pelvis: The liver, spleen, pancreas and  adrenal glands are unremarkable to the limits of a noncontrast CT  scan. The gallbladder is present. The small and large bowel are normal  in caliber. The appendix is unremarkable. No bowel wall thickening,  pneumatosis or free intraperitoneal gas. Nonspecific  mildly enlarged  2.2 x 1.2 cm gastrohepatic ligament lymph node (series 2 image 19),  near the gastroesophageal junction.    Scan through the lower chest is significant for a smoothly-contoured  3.4 x 1.8 cm pleural-based soft tissue attenuation structure in the  anterior aspect of the lower right hemithorax (series 2 image 7). No  free fluid in the abdomen or pelvis. Atherosclerotic calcification in  the abdominal aorta.      Impression    IMPRESSION:  1. 0.4 cm distal left ureteral calculus at the ureterovesicular  junction, resulting in slight obstruction.  2. No additional renal or ureteral calculi.  3. 3 cm smoothly-contoured pleural-based soft tissue structure in the  lower right hemithorax. 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    EMELY HUDDLESTON MD       ASSESSMENT AND RECOMMENDATIONS:  This 39-year-old gentleman who has a right-sided close to 3 cm pleural-based nodule, which was discovered incidentally on a CT of the abdomen and pelvis which was done for kidney stones.  He also has a mildly enlarged 2.2 cm gastrohepatic ligament lymph node.  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  A paternal grandmother at the age of 65, had lung cancer and a paternal uncle in his 40s, had lung cancer as well.  This person has a personal history of tobacco smoking for 15 years, which he quit 5 years ago.      I will have him get a CT scan of the chest with contrast.  I discussed that I would like him to be seen by Interventional pulmonary by Dr. Tatianna Nazario.  We will help him schedule that appointment for workup of this lung mass.         CATALINO MULLER MD             D: 2019   T: 2019   MT: YOVANNY      Name:     LEO HEBERT   MRN:      [id number]        Account:      [id number]   :      1980           Visit Date:   2019      Document: [id number]       cc: Tatianna Nazario MD

## 2019-05-20 ENCOUNTER — PATIENT OUTREACH (OUTPATIENT)
Dept: ONCOLOGY | Facility: CLINIC | Age: 39
End: 2019-05-20

## 2019-05-20 NOTE — PROGRESS NOTES
Called patient to let him know that appt had been moved to 6/10/19 from 6/26/19 as well as location from [place] to [facility] and notified New Patient Records.  Patient prefers early morning or late afternoon and would like and earlier or later appt if possible.  Will try to accommodate if appt becomes available.  Patient is aware of appt location and confirmed time.  Will contact clinic with questions or concerns.  Ian Holder, RN, BSN, OCN  [facility] [place]  Patient Care Coordinator  '

## 2019-05-24 ENCOUNTER — OFFICE VISIT (OUTPATIENT)
Dept: URGENT CARE | Facility: URGENT CARE | Age: 39
End: 2019-05-24
Payer: COMMERCIAL

## 2019-05-24 ENCOUNTER — NURSE TRIAGE (OUTPATIENT)
Dept: NURSING | Facility: CLINIC | Age: 39
End: 2019-05-24

## 2019-05-24 VITALS
BODY MASS INDEX: 32.6 KG/M2 | RESPIRATION RATE: 12 BRPM | TEMPERATURE: 98 F | HEART RATE: 97 BPM | DIASTOLIC BLOOD PRESSURE: 76 MMHG | OXYGEN SATURATION: 96 % | HEIGHT: 71 IN | WEIGHT: 232.9 LBS | SYSTOLIC BLOOD PRESSURE: 132 MMHG

## 2019-05-24 DIAGNOSIS — R59.1 LYMPHADENOPATHY OF HEAD AND NECK: Primary | ICD-10-CM

## 2019-05-24 DIAGNOSIS — K14.0 TONGUE INFECTION: ICD-10-CM

## 2019-05-24 PROCEDURE — 99213 OFFICE O/P EST LOW 20 MIN: CPT | Performed by: FAMILY MEDICINE

## 2019-05-24 RX ORDER — PENICILLIN V POTASSIUM 500 MG/1
500 TABLET, FILM COATED ORAL 3 TIMES DAILY
Qty: 21 TABLET | Refills: 0 | Status: SHIPPED | OUTPATIENT
Start: 2019-05-24 | End: 2019-05-31

## 2019-05-24 ASSESSMENT — PAIN SCALES - GENERAL: PAINLEVEL: MILD PAIN (2)

## 2019-05-24 ASSESSMENT — MIFFLIN-ST. JEOR: SCORE: 2003.56

## 2019-05-25 NOTE — TELEPHONE ENCOUNTER
"\"I just noticed that I have a large swollen lump or lymph node on my tongue in the back.It came on fast. I was feeling a tingling feeling and some pain and looked, it's about golf ball size. I can breathe and swallow ok right now, but it's big.\" denies fever 98.8 or other sx. Triaged and advised to be seen.  Ruby Morales RN [facility]        Reason for Disposition    [1] Single large node AND [2] size > 1 inch (2.5 cm) AND [3] no fever    Additional Information    Negative: Severe difficulty breathing (e.g., struggling for each breath, speaks in single words)    Negative: Known hernia is main concern (i.e., soft lump or swelling in the groin that goes away when you push on it)    Negative: Swelling of scrotum is main symptom    Negative: Swelling of face is main symptom    Negative: [1] Swollen node is in the neck AND [2] < 1 inch (2.5 cm) in size AND [3] sore throat is main symptom    Negative: [1] Node is in the neck AND [2] causes difficulty breathing    Negative: [1] Node is in the neck AND [2] can't swallow fluids    Negative: Fever > 103 F (39.4 C)    Negative: [1] Lump or swelling in groin AND [2] pulsating (like heartbeat)    Negative: Patient sounds very sick or weak to the triager    Negative: [1] Single large node AND [2] size > 1 inch (2.5 cm) AND [3] fever    Negative: [1] Overlying skin is red AND [2] fever    Protocols used: LYMPH NODES [id number]      "

## 2019-05-25 NOTE — PROGRESS NOTES
SUBJECTIVE:   David Crane is a 39 year old male presenting with a chief complaint of swollen under the right jaw and a burning sensation at the right lateral margin of the posterior right tongue.   No recent cuts on the tongue.   Patient discovered this abnormality two hours ago .No difficulty breathing/swallowing.  No blood/pus from the wounds.    Onset of symptoms was two hours ago.  Course of illness is still present.  .    Current and Associated symptoms: as listed above.   Treatment measures tried include none.  .  Predisposing factors include none.  No obvious injuries.  No fevers.  No bleeding.  .    Past Medical History:   Diagnosis Date     Other, mixed, or unspecified nondependent drug abuse, in remission     History of IV drug use, methamphetamine and other drugs.       Patient has used chewing tobacco for the past four years.  Patient quit smoking five years ago.      Current Outpatient Medications   Medication Sig Dispense Refill     fluticasone (FLONASE) 50 MCG/ACT spray Spray 1 spray into both nostrils daily       MULTIVITAMIN/MINERAL FORMULA OR TABS 1 TABLET DAILY       tamsulosin (FLOMAX) 0.4 MG capsule Take 1 capsule (0.4 mg) by mouth daily (Patient not taking: Reported on 5/24/2019) 14 capsule 0     Social History     Tobacco Use     Smoking status: Former Smoker     Packs/day: 0.25     Years: 9.00     Pack years: 2.25     Types: Cigarettes     Smokeless tobacco: Current User     Types: Snuff     Tobacco comment: 4-20  quit about 2 months ago   Substance Use Topics     Alcohol use: Not Currently       ROS:  CONSTITUTIONAL:NEGATIVE  for fevers.    INTEGUMENTARY/SKIN: NEGATIVE for worrisome rashes, moles or lesions  EYES: NEGATIVE for vision changes or irritation  ENT/MOUTH: POSITIVE for swelling under the right jaw and a burning sensation at the right lateral posterior tongue.    RESP:NEGATIVE for significant cough or SOB    OBJECTIVE:  /76 (BP Location: Right arm, Patient Position:  "Sitting, Cuff Size: Adult Large)   Pulse 97   Temp 98  F (36.7  C) (Tympanic)   Resp 12   Ht 1.803 m (5' 11\")   Wt 105.6 kg (232 lb 14.4 oz)   SpO2 96%   BMI 32.48 kg/m    GENERAL APPEARANCE: healthy, alert and no distress.  No acute respiratory distress.    HENT: tongue:  The right posterior lateral margin has increased erythema and tenderness and edema.  The tonsils, teeth, gums, palate, lips are within normal limits.  Normal oropharynx.  No evidence of airway obstruction   NECK: I was unable to palpate enlarged submandibular lymph nodes bilaterally.  No increased edema at the right cheek.  No erythema.   No masses.        ASSESSMENT:  Lymphadenopathy of the neck  Tongue infection    PLAN:  For the Tongue Infection:  Rx:  Penicillin VK    For the lymphadenopathy of the neck:    follow up with an otolaryngologist for further evaluation.  I ordered a referral for the patient.          Domingo Gaines MD    "

## 2019-05-25 NOTE — PATIENT INSTRUCTIONS
Avoid spicy, salty, sour foods to avoid worsening the tongue discomfort.      Tylenol, Ibuprofen for the pain.      follow up with the Otolaryngologist for further evaluation of the right neck problem.      Eat bland, soft foods for now until the tongue discomfort resolves.

## 2019-06-10 ENCOUNTER — HOSPITAL ENCOUNTER (OUTPATIENT)
Facility: CLINIC | Age: 39
Setting detail: SPECIMEN
End: 2019-06-10
Attending: INTERNAL MEDICINE

## 2019-06-10 ENCOUNTER — ONCOLOGY VISIT (OUTPATIENT)
Dept: ONCOLOGY | Facility: CLINIC | Age: 39
End: 2019-06-10
Attending: INTERNAL MEDICINE

## 2019-06-10 VITALS
TEMPERATURE: 98.1 F | WEIGHT: 230 LBS | HEIGHT: 71 IN | DIASTOLIC BLOOD PRESSURE: 86 MMHG | SYSTOLIC BLOOD PRESSURE: 142 MMHG | BODY MASS INDEX: 32.2 KG/M2 | RESPIRATION RATE: 16 BRPM | OXYGEN SATURATION: 94 % | HEART RATE: 86 BPM

## 2019-06-10 DIAGNOSIS — J45.990 EXERCISE-INDUCED ASTHMA: ICD-10-CM

## 2019-06-10 DIAGNOSIS — R05.9 COUGH: Primary | ICD-10-CM

## 2019-06-10 DIAGNOSIS — J94.8 MASS OF PLEURA: ICD-10-CM

## 2019-06-10 PROCEDURE — G0463 HOSPITAL OUTPT CLINIC VISIT: HCPCS

## 2019-06-10 RX ORDER — ALBUTEROL SULFATE 90 UG/1
1-2 AEROSOL, METERED RESPIRATORY (INHALATION) DAILY PRN
Qty: 1 INHALER | Refills: 6 | Status: SHIPPED | OUTPATIENT
Start: 2019-06-10 | End: 2023-01-02

## 2019-06-10 ASSESSMENT — PAIN SCALES - GENERAL: PAINLEVEL: NO PAIN (0)

## 2019-06-10 ASSESSMENT — MIFFLIN-ST. JEOR: SCORE: 1990.4

## 2019-06-10 NOTE — NURSING NOTE
"Oncology Rooming Note    Yuliana 10, 2019 11:00 AM   David Crane is a 39 year old male who presents for:    Chief Complaint   Patient presents with     Lung Nodule     New Patient Consult     Initial Vitals: /86   Pulse 86   Temp 98.1  F (36.7  C) (Tympanic)   Resp 16   Ht 1.803 m (5' 11\")   Wt 104.3 kg (230 lb)   SpO2 94%   BMI 32.08 kg/m   Estimated body mass index is 32.08 kg/m  as calculated from the following:    Height as of this encounter: 1.803 m (5' 11\").    Weight as of this encounter: 104.3 kg (230 lb). Body surface area is 2.29 meters squared.  No Pain (0) Comment: Data Unavailable   No LMP for male patient.  Allergies reviewed: Yes  Medications reviewed: Yes    Medications: Medication refills not needed today.  Pharmacy name entered into [employer]:    [facility] - [place], [place] - [address]  [id number] - [place], [place] - [address] [id number] - [place], [place] - [address] [facility]  AT [facility]    Clinical concerns: New Patient       Laura Crabtree CMA              "

## 2019-06-10 NOTE — LETTER
6/10/2019         RE: David Crane  [address]  [facility] [address]        Dear Colleague,    Thank you for referring your patient, David Crane, to the [facility] CANCER [facility]. Please see a copy of my visit note below.    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   - No new respiratory symptoms or complaints.    - he experiences exertional dyspnea when running, difficulty to exhale with wheezing or whistling  - doesn't recall any blunt trauma or previous serious respiratory infections.     Exposure history: Denies asbestos or radon exposure   TB risk factors: No  Prior Imaging: occupational  Constitutional Symptoms: No  Personal history of cancer:No  Up to date on age-appropriate cancer screening:No    ROS Pulmonary  Dyspnea: Yes, Cough: Yes, Chest pain: No, Wheezing: No, Sputum Production: Yes, Hemoptysis: No  A complete ROS was otherwise negative except as noted in the HPI.  The patient was seen and examined by Tatianna Nazario MD   Current Outpatient Medications   Medication     fluticasone (FLONASE) 50 MCG/ACT spray     MULTIVITAMIN/MINERAL FORMULA OR TABS     tamsulosin (FLOMAX) 0.4 MG capsule     No current facility-administered medications for this visit.      Allergies   Allergen Reactions     No Known Drug Allergies      Social History     Socioeconomic History     Marital status:      Spouse name: Not on file     Number of children: 0     Years of education: Not on file     Highest education level: Not on file   Occupational History     Occupation: rail car loaded     Employer: [employer]     Employer: [employer]   Social Needs     Financial resource strain: Not on file     Food insecurity:     Worry: Not on file      Inability: Not on file     Transportation needs:     Medical: Not on file     Non-medical: Not on file   Tobacco Use     Smoking status: Former Smoker     Packs/day: 0.25     Years: 9.00     Pack years: 2.25     Types: Cigarettes     Smokeless tobacco: Current User     Types: Snuff     Tobacco comment: 4-20  quit about 2 months ago   Substance and Sexual Activity     Alcohol use: Not Currently     Drug use: Not Currently     Sexual activity: Not Currently   Lifestyle     Physical activity:     Days per week: Not on file     Minutes per session: Not on file     Stress: Not on file   Relationships     Social connections:     Talks on phone: Not on file     Gets together: Not on file     Attends Episcopal service: Not on file     Active member of club or organization: Not on file     Attends meetings of clubs or organizations: Not on file     Relationship status: Not on file     Intimate partner violence:     Fear of current or ex partner: Not on file     Emotionally abused: Not on file     Physically abused: Not on file     Forced sexual activity: Not on file   Other Topics Concern      Service No     Blood Transfusions No     Caffeine Concern No     Occupational Exposure Yes     Comment: [employer] mixture, and [employer]     Hobby Hazards No     Sleep Concern No     Stress Concern No     Weight Concern No     Special Diet No     Back Care No     Exercise No     Bike Helmet Not Asked     Seat Belt Yes     Self-Exams Not Asked   Social History Narrative     Not on file     Past Medical History:   Diagnosis Date     Other, mixed, or unspecified nondependent drug abuse, in remission     History of IV drug use, methamphetamine and other drugs.       Past Surgical History:   Procedure Laterality Date     HC STRABISMUS SURG,TWO HORIZ MUSCLE  01/09/06    LT     Family History   Problem Relation Age of Onset     Diabetes Maternal Grandmother      Cancer Maternal Grandmother         lung cancer,heavy  "smoker     Cerebrovascular Disease Maternal Grandfather      Eye Disorder Maternal Aunt         strabismus     Heart Disease Paternal Grandfather         had major heart attack and      Eye Disorder Other         cousin- strabismus     Hypertension No family hx of      Breast Cancer No family hx of      Cancer - colorectal No family hx of      Prostate Cancer No family hx of      Lipids No family hx of      Neurologic Disorder No family hx of      Respiratory No family hx of      Thyroid Disease No family hx of      Exam:   /86   Pulse 86   Temp 98.1  F (36.7  C) (Tympanic)   Resp 16   Ht 1.803 m (5' 11\")   Wt 104.3 kg (230 lb)   SpO2 94%   BMI 32.08 kg/m     GENERAL APPEARANCE: Well developed, well nourished, alert, and in no apparent distress.  EYES: PERRL, EOMI  HENT: Nasal mucosa with no edema and no hyperemia. No nasal polyps.  EARS: Canals clear, TMs normal  MOUTH: Oral mucosa is moist, without any lesions, no tonsillar enlargement, no oropharyngeal exudate.  NECK: supple, no masses, no thyromegaly.  LYMPHATICS: No significant axillary, cervical, or supraclavicular nodes.  RESP: normal percussion, good air flow throughout.  No crackles. No rhonchi. No wheezes.  CV: Normal S1, S2, regular rhythm, normal rate. No murmur.  No rub. No gallop. No LE edema.   ABDOMEN:  Bowel sounds normal, soft, nontender, no HSM or masses.   MS: extremities normal. No clubbing. No cyanosis.  SKIN: no rash on limited exam  NEURO: Mentation intact, speech normal, normal strength and tone, normal gait and stance  PSYCH: mentation appears normal. 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 Options are surveillance vs surgical resection   Repeat CT in 6 months  Exertional dyspnea and cough - I am ordering PFTs for next convenient time to evaluate for obstructive lung disease, in case of possible surgery.    Albuterol for probable exercise induced asthma.    Nicotine dependence - spent 3 minutes discussing    RTC 6 months      Again, thank you for allowing me to participate in the care of your patient.        Sincerely,        Tatianna Nazario MD

## 2019-06-10 NOTE — PROGRESS NOTES
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   - No new respiratory symptoms or complaints.    - he experiences exertional dyspnea when running, difficulty to exhale with wheezing or whistling  - doesn't recall any blunt trauma or previous serious respiratory infections.     Exposure history: Denies asbestos or radon exposure   TB risk factors: No  Prior Imaging: occupational  Constitutional Symptoms: No  Personal history of cancer:No  Up to date on age-appropriate cancer screening:No    ROS Pulmonary  Dyspnea: Yes, Cough: Yes, Chest pain: No, Wheezing: No, Sputum Production: Yes, Hemoptysis: No  A complete ROS was otherwise negative except as noted in the HPI.  The patient was seen and examined by Tatianna Nazario MD   Current Outpatient Medications   Medication     fluticasone (FLONASE) 50 MCG/ACT spray     MULTIVITAMIN/MINERAL FORMULA OR TABS     tamsulosin (FLOMAX) 0.4 MG capsule     No current facility-administered medications for this visit.      Allergies   Allergen Reactions     No Known Drug Allergies      Social History     Socioeconomic History     Marital status:      Spouse name: Not on file     Number of children: 0     Years of education: Not on file     Highest education level: Not on file   Occupational History     Occupation: rail car loaded     Employer: [employer]     Employer: [employer]   Social Needs     Financial resource strain: Not on file     Food insecurity:     Worry: Not on file     Inability: Not on file     Transportation needs:     Medical: Not on file     Non-medical: Not on file   Tobacco Use     Smoking status: Former Smoker     Packs/day: 0.25     Years: 9.00     Pack years: 2.25     Types: Cigarettes     Smokeless tobacco: Current User      Types: Snuff     Tobacco comment: 4-20  quit about 2 months ago   Substance and Sexual Activity     Alcohol use: Not Currently     Drug use: Not Currently     Sexual activity: Not Currently   Lifestyle     Physical activity:     Days per week: Not on file     Minutes per session: Not on file     Stress: Not on file   Relationships     Social connections:     Talks on phone: Not on file     Gets together: Not on file     Attends Bahai service: Not on file     Active member of club or organization: Not on file     Attends meetings of clubs or organizations: Not on file     Relationship status: Not on file     Intimate partner violence:     Fear of current or ex partner: Not on file     Emotionally abused: Not on file     Physically abused: Not on file     Forced sexual activity: Not on file   Other Topics Concern      Service No     Blood Transfusions No     Caffeine Concern No     Occupational Exposure Yes     Comment: [employer] mixture, and [employer]     Hobby Hazards No     Sleep Concern No     Stress Concern No     Weight Concern No     Special Diet No     Back Care No     Exercise No     Bike Helmet Not Asked     Seat Belt Yes     Self-Exams Not Asked   Social History Narrative     Not on file     Past Medical History:   Diagnosis Date     Other, mixed, or unspecified nondependent drug abuse, in remission     History of IV drug use, methamphetamine and other drugs.       Past Surgical History:   Procedure Laterality Date     HC STRABISMUS SURG,TWO HORIZ MUSCLE  06    LT     Family History   Problem Relation Age of Onset     Diabetes Maternal Grandmother      Cancer Maternal Grandmother         lung cancer,heavy smoker     Cerebrovascular Disease Maternal Grandfather      Eye Disorder Maternal Aunt         strabismus     Heart Disease Paternal Grandfather         had major heart attack and      Eye Disorder Other         cousin- strabismus     Hypertension No family hx of   "    Breast Cancer No family hx of      Cancer - colorectal No family hx of      Prostate Cancer No family hx of      Lipids No family hx of      Neurologic Disorder No family hx of      Respiratory No family hx of      Thyroid Disease No family hx of      Exam:   /86   Pulse 86   Temp 98.1  F (36.7  C) (Tympanic)   Resp 16   Ht 1.803 m (5' 11\")   Wt 104.3 kg (230 lb)   SpO2 94%   BMI 32.08 kg/m    GENERAL APPEARANCE: Well developed, well nourished, alert, and in no apparent distress.  EYES: PERRL, EOMI  HENT: Nasal mucosa with no edema and no hyperemia. No nasal polyps.  EARS: Canals clear, TMs normal  MOUTH: Oral mucosa is moist, without any lesions, no tonsillar enlargement, no oropharyngeal exudate.  NECK: supple, no masses, no thyromegaly.  LYMPHATICS: No significant axillary, cervical, or supraclavicular nodes.  RESP: normal percussion, good air flow throughout.  No crackles. No rhonchi. No wheezes.  CV: Normal S1, S2, regular rhythm, normal rate. No murmur.  No rub. No gallop. No LE edema.   ABDOMEN:  Bowel sounds normal, soft, nontender, no HSM or masses.   MS: extremities normal. No clubbing. No cyanosis.  SKIN: no rash on limited exam  NEURO: Mentation intact, speech normal, normal strength and tone, normal gait and stance  PSYCH: mentation appears normal. 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 Options are surveillance vs surgical resection   Repeat CT in 6 months  Exertional dyspnea and cough - I am ordering PFTs for next convenient time to evaluate for obstructive lung disease, in case of possible surgery.    Albuterol for " probable exercise induced asthma.    Nicotine dependence - spent 3 minutes discussing    RTC 6 months

## 2019-06-10 NOTE — PATIENT INSTRUCTIONS
See if you can find the old chest x-rays, would be helpful    I am ordering lung function tests today to look for asthma/COPD and in case you get surgery.     I am prescribing albuterol, which is a short-acting inhaler to open the bronchial tubes.   I recommend using it prior to running/strenuous activity.     We will plan to repeat CT in about 6 months.

## 2019-06-24 ENCOUNTER — HOSPITAL ENCOUNTER (OUTPATIENT)
Dept: RESPIRATORY THERAPY | Facility: CLINIC | Age: 39
Discharge: HOME | End: 2019-06-24
Attending: INTERNAL MEDICINE | Admitting: INTERNAL MEDICINE

## 2019-06-24 DIAGNOSIS — R05.9 COUGH: ICD-10-CM

## 2019-06-24 DIAGNOSIS — J94.8 MASS OF PLEURA: ICD-10-CM

## 2019-06-24 LAB
DLCOCOR-%PRED-PRE: 108 %
DLCOCOR-PRE: 35.38 ML/MIN/MMHG
DLCOUNC-%PRED-PRE: 108 %
DLCOUNC-PRE: 35.48 ML/MIN/MMHG
DLCOUNC-PRED: 32.56 ML/MIN/MMHG
ERV-%PRED-PRE: 74 %
ERV-PRE: 1.01 L
ERV-PRED: 1.36 L
EXPTIME-PRE: 6.88 SEC
FEF2575-%PRED-PRE: 75 %
FEF2575-PRE: 3.31 L/SEC
FEF2575-PRED: 4.35 L/SEC
FEFMAX-%PRED-PRE: 116 %
FEFMAX-PRE: 12.21 L/SEC
FEFMAX-PRED: 10.45 L/SEC
FEV1-%PRED-PRE: 98 %
FEV1-PRE: 4.36 L
FEV1FEV6-PRE: 75 %
FEV1FEV6-PRED: 82 %
FEV1FVC-PRE: 75 %
FEV1FVC-PRED: 81 %
FEV1SVC-PRE: 72 %
FEV1SVC-PRED: 78 %
FIFMAX-PRE: 9.23 L/SEC
FRCPLETH-%PRED-PRE: 92 %
FRCPLETH-PRE: 3.22 L
FRCPLETH-PRED: 3.46 L
FVC-%PRED-PRE: 105 %
FVC-PRE: 5.79 L
FVC-PRED: 5.5 L
GAW-%PRED-PRE: 63 %
GAW-PRE: 0.65 L/S/CMH2O
GAW-PRED: 1.03 L/S/CMH2O
IC-%PRED-PRE: 116 %
IC-PRE: 5.07 L
IC-PRED: 4.34 L
RVPLETH-%PRED-PRE: 113 %
RVPLETH-PRE: 2.21 L
RVPLETH-PRED: 1.95 L
SGAW-%PRED-PRE: 229 %
SGAW-PRE: 0.19 1/CMH2O*S
SGAW-PRED: 0.08 1/CMH2O*S
SRAW-%PRED-PRE: 110 %
SRAW-PRE: 5.24 CMH2O*S
SRAW-PRED: 4.76 CMH2O*S
TLCPLETH-%PRED-PRE: 113 %
TLCPLETH-PRE: 8.29 L
TLCPLETH-PRED: 7.33 L
VA-%PRED-PRE: 107 %
VA-PRE: 7.47 L
VC-%PRED-PRE: 106 %
VC-PRE: 6.07 L
VC-PRED: 5.7 L

## 2019-06-24 PROCEDURE — 94010 BREATHING CAPACITY TEST: CPT

## 2019-06-24 PROCEDURE — 94726 PLETHYSMOGRAPHY LUNG VOLUMES: CPT

## 2019-06-24 PROCEDURE — 94729 DIFFUSING CAPACITY: CPT

## 2019-06-24 NOTE — PROGRESS NOTES
PFT Note: Patient completed pulmonary function testing with spirometry, lung volumes and diffusion. Good patient effort and cooperation. The results of this test met the ATS standards for acceptability and repeatability. Hgb result of 14.7 was used from 4/25/2019 for Diffusion.

## 2019-06-25 NOTE — PROCEDURES
Procedure Date: 2019      Please see medical chart for graphs and statistics related to this report.       REFERRING PHYSICIAN:   Tatianna Nazario                                        TECHNICIAN:   Viki Romero      DIAGNOSIS:   Lung nodule, Shortness of breath                                                                 HEIGHT:   71.00 inches                                                                    WEIGHT:   230.00 Lbs.      DYSPNEA:   After severe exertion                                                                COUGH:   No cough   WHEEZE:   Rare                                                                      TOBACCO PROD:   Cigarette   YEARS SMOKED:   15.0                                                     PKS/DAY:   1.0   YEARS QUIT:    5.0                                                              MEDICATIONS:   Albuterol prn       POST-TEST COMMENTS:    Good patient effort and cooperation.  The results of testing meet ATS criteria for acceptability and repeatability.  Hgb result of 14.7 was used from 2019 for Diffusion.            INTERPRETATION:      1.  Normal PFTs         TENZIN WALLACE MD             D: 2019   T: 2019   MT: [email or web address]      Name:     LEO HEBERT   MRN:      [id number]        Account:        [id number]   :      1980           Procedure Date: 2019      Document: [id number]       cc: Tatianna Nazario MD

## 2019-06-26 ENCOUNTER — PRE VISIT (OUTPATIENT)
Dept: PULMONOLOGY | Facility: CLINIC | Age: 39
End: 2019-06-26

## 2019-06-28 DIAGNOSIS — R05.9 COUGH: Primary | ICD-10-CM

## 2019-06-28 RX ORDER — FLUTICASONE PROPIONATE 50 MCG
1-2 SPRAY, SUSPENSION (ML) NASAL DAILY
Qty: 16 G | Refills: 3 | Status: SHIPPED | OUTPATIENT
Start: 2019-06-28

## 2019-06-28 NOTE — TELEPHONE ENCOUNTER
Last filled 4/26/19  Last office visit 6/10/2019  Next office visit 12/9/2019      Routing refill request to provider    Giselle Cassidy RN BSN [facility]

## 2019-11-06 ENCOUNTER — HEALTH MAINTENANCE LETTER (OUTPATIENT)
Age: 39
End: 2019-11-06

## 2019-12-18 ENCOUNTER — HOSPITAL ENCOUNTER (OUTPATIENT)
Dept: CT IMAGING | Facility: CLINIC | Age: 39
Discharge: HOME | End: 2019-12-18
Attending: INTERNAL MEDICINE | Admitting: INTERNAL MEDICINE
Payer: COMMERCIAL

## 2019-12-18 DIAGNOSIS — R05.9 COUGH: ICD-10-CM

## 2019-12-18 DIAGNOSIS — J94.8 MASS OF PLEURA: ICD-10-CM

## 2019-12-18 PROCEDURE — 71260 CT THORAX DX C+: CPT

## 2019-12-18 PROCEDURE — 25000128 H RX IP 250 OP 636: Performed by: INTERNAL MEDICINE

## 2019-12-18 RX ORDER — IOPAMIDOL 755 MG/ML
80 INJECTION, SOLUTION INTRAVASCULAR ONCE
Status: COMPLETED | OUTPATIENT
Start: 2019-12-18 | End: 2019-12-18

## 2019-12-18 RX ADMIN — IOPAMIDOL 80 ML: 755 INJECTION, SOLUTION INTRAVENOUS at 08:26

## 2020-01-13 ENCOUNTER — ONCOLOGY VISIT (OUTPATIENT)
Dept: ONCOLOGY | Facility: CLINIC | Age: 40
End: 2020-01-13
Attending: INTERNAL MEDICINE

## 2020-01-13 VITALS
HEART RATE: 84 BPM | OXYGEN SATURATION: 95 % | RESPIRATION RATE: 16 BRPM | SYSTOLIC BLOOD PRESSURE: 133 MMHG | BODY MASS INDEX: 32.78 KG/M2 | DIASTOLIC BLOOD PRESSURE: 85 MMHG | WEIGHT: 235 LBS | TEMPERATURE: 97 F

## 2020-01-13 DIAGNOSIS — J94.8 MASS OF PLEURA: Primary | ICD-10-CM

## 2020-01-13 PROCEDURE — G0463 HOSPITAL OUTPT CLINIC VISIT: HCPCS

## 2020-01-13 ASSESSMENT — PAIN SCALES - GENERAL: PAINLEVEL: NO PAIN (0)

## 2020-01-13 NOTE — LETTER
2020         RE: David Crane  [address]  [facility] [address]        Dear Colleague,    Thank you for referring your patient, David Crane, to the [facility] CANCER CLINIC. Please see a copy of my visit note below.    [facility] Cancer Care Nodule Clinic Follow Up Visit    Reason for Visit  David Crane is a 40 year old male who is followed for pleural nodule  Pulmonary HPI  NO new issues since last visit, he is going through a divorce including custody, here with his toddler daughter today. Has gained weight because of this stress.     ROS Pulmonary  Dyspnea: No, Cough: No, Chest pain: No, Wheezing: No, Sputum Production: No, Hemoptysis: No  A complete ROS was otherwise negative except as noted in the HPI.  The patient was seen and examined by Tatianna Nazario MD   Current Outpatient Medications   Medication     fluticasone (FLONASE) 50 MCG/ACT nasal spray     MULTIVITAMIN/MINERAL FORMULA OR TABS     albuterol (PROAIR HFA/PROVENTIL HFA/VENTOLIN HFA) 108 (90 Base) MCG/ACT inhaler     No current facility-administered medications for this visit.      Allergies   Allergen Reactions     No Known Drug Allergies      Social History     Socioeconomic History     Marital status:      Spouse name: Not on file     Number of children: 0     Years of education: Not on file     Highest education level: Not on file   Occupational History     Occupation: rail car loaded     Employer: [employer]     Employer: [employer]   Social Needs     Financial resource strain: Not on file     Food insecurity:     Worry: Not on file     Inability: Not on file     Transportation needs:     Medical: Not on file     Non-medical: Not on file   Tobacco Use     Smoking status: Former Smoker     Packs/day: 1.50     Years: 13.00     Pack years: 19.50     Types: Cigarettes     Start date: 6/10/1997     Last attempt to quit: 6/10/2010     Years since quittin.6      Smokeless tobacco: Former User     Types: Snuff     Tobacco comment: rarely smokes 1 with his dad   Substance and Sexual Activity     Alcohol use: Not Currently     Drug use: Not Currently     Sexual activity: Not Currently   Lifestyle     Physical activity:     Days per week: Not on file     Minutes per session: Not on file     Stress: Not on file   Relationships     Social connections:     Talks on phone: Not on file     Gets together: Not on file     Attends Scientology service: Not on file     Active member of club or organization: Not on file     Attends meetings of clubs or organizations: Not on file     Relationship status: Not on file     Intimate partner violence:     Fear of current or ex partner: Not on file     Emotionally abused: Not on file     Physically abused: Not on file     Forced sexual activity: Not on file   Other Topics Concern      Service No     Blood Transfusions No     Caffeine Concern No     Occupational Exposure Yes     Comment: [employer] mixture, and [employer]     Hobby Hazards No     Sleep Concern No     Stress Concern No     Weight Concern No     Special Diet No     Back Care No     Exercise No     Bike Helmet Not Asked     Seat Belt Yes     Self-Exams Not Asked   Social History Narrative     Not on file     Past Medical History:   Diagnosis Date     Other, mixed, or unspecified nondependent drug abuse, in remission     History of IV drug use, methamphetamine and other drugs.       Septic arthritis of hip (H) 2002     Past Surgical History:   Procedure Laterality Date     HC STRABISMUS SURG,TWO HORIZ MUSCLE  01/09/06    LT     LASER SURGERY OF EYE       Family History   Problem Relation Age of Onset     Multiple Sclerosis Mother      Uterine Cancer Mother      Diabetes Maternal Grandmother      Lung Cancer Maternal Grandmother         lung cancer,heavy smoker     Cerebrovascular Disease Maternal Grandfather      Eye Disorder Maternal Aunt         strabismus      Heart Disease Paternal Grandfather         had major heart attack and      Eye Disorder Other         cousin- strabismus     Lung Cancer Maternal Uncle      Hypertension No family hx of      Breast Cancer No family hx of      Cancer - colorectal No family hx of      Prostate Cancer No family hx of      Lipids No family hx of      Neurologic Disorder No family hx of      Respiratory No family hx of      Thyroid Disease No family hx of      Exam:   /85   Pulse 84   Temp 97  F (36.1  C) (Tympanic)   Resp 16   Wt 106.6 kg (235 lb)   SpO2 95%   BMI 32.78 kg/m     GENERAL APPEARANCE: Well developed, well nourished, alert, and in no apparent distress.  EYES: PERRL, EOMI  HENT: Nasal mucosa with no edema and no hyperemia. No nasal polyps.  EARS: Canals clear, TMs normal  MOUTH: Oral mucosa is moist, without any lesions, no tonsillar enlargement, no oropharyngeal exudate.  NECK: supple, no masses, no thyromegaly.  LYMPHATICS: No significant axillary, cervical, or supraclavicular nodes.  RESP: normal percussion, good air flow throughout.  No crackles. No rhonchi. No wheezes.  CV: Normal S1, S2, regular rhythm, normal rate. No murmur.  No rub. No gallop. No LE edema.   ABDOMEN:  Bowel sounds normal, soft, nontender, no HSM or masses.   MS: extremities normal. No clubbing. No cyanosis.  SKIN: no rash on limited exam  NEURO: Mentation intact, speech normal, normal strength and tone, normal gait and stance  PSYCH: mentation appears normal. 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 Options are  surveillance vs surgical resection   He is overwhelmed with divorce right now  Exertional dyspnea and cough - PFTs normal     Follow up with thoracic surgery Dr Francis at the end of March, after next legal proceedings.       Again, thank you for allowing me to participate in the care of your patient.        Sincerely,        Tatianna Nazario MD

## 2020-01-13 NOTE — NURSING NOTE
"Oncology Rooming Note    January 13, 2020 12:33 PM   David Crane is a 40 year old male who presents for:    Chief Complaint   Patient presents with     Oncology Clinic Visit     Follow up CT     Initial Vitals: /85   Pulse 84   Temp 97  F (36.1  C) (Tympanic)   Resp 16   Wt 106.6 kg (235 lb)   SpO2 95%   BMI 32.78 kg/m   Estimated body mass index is 32.78 kg/m  as calculated from the following:    Height as of 6/10/19: 1.803 m (5' 11\").    Weight as of this encounter: 106.6 kg (235 lb). Body surface area is 2.31 meters squared.  No Pain (0) Comment: Data Unavailable   No LMP for male patient.  Allergies reviewed: Yes  Medications reviewed: Yes    Medications: Medication refills not needed today.  Pharmacy name entered into [email or web address]:    [id number] - [place], [place] - [address]  [id number] - [place], [place] - [address] [id number] - [place], [place] - [address] [facility] [address] [facility]  AT [facility]    Clinical concerns: follow up       Karma Allison [facility]              "

## 2020-01-13 NOTE — PROGRESS NOTES
[facility] Follow Up Visit    Reason for Visit  David Crane is a 40 year old male who is followed for pleural nodule  Pulmonary HPI  NO new issues since last visit, he is going through a divorce including custody, here with his toddler daughter today. Has gained weight because of this stress.     ROS Pulmonary  Dyspnea: No, Cough: No, Chest pain: No, Wheezing: No, Sputum Production: No, Hemoptysis: No  A complete ROS was otherwise negative except as noted in the HPI.  The patient was seen and examined by Tatianna Nazario MD   Current Outpatient Medications   Medication     fluticasone (FLONASE) 50 MCG/ACT nasal spray     MULTIVITAMIN/MINERAL FORMULA OR TABS     albuterol (PROAIR HFA/PROVENTIL HFA/VENTOLIN HFA) 108 (90 Base) MCG/ACT inhaler     No current facility-administered medications for this visit.      Allergies   Allergen Reactions     No Known Drug Allergies      Social History     Socioeconomic History     Marital status:      Spouse name: Not on file     Number of children: 0     Years of education: Not on file     Highest education level: Not on file   Occupational History     Occupation: rail car loaded     Employer: [employer]     Employer: [employer]   Social Needs     Financial resource strain: Not on file     Food insecurity:     Worry: Not on file     Inability: Not on file     Transportation needs:     Medical: Not on file     Non-medical: Not on file   Tobacco Use     Smoking status: Former Smoker     Packs/day: 1.50     Years: 13.00     Pack years: 19.50     Types: Cigarettes     Start date: 6/10/1997     Last attempt to quit: 6/10/2010     Years since quittin.6     Smokeless tobacco: Former User     Types: Snuff     Tobacco comment: rarely smokes 1 with his dad   Substance and Sexual Activity     Alcohol use: Not Currently     Drug use: Not Currently     Sexual activity: Not Currently   Lifestyle     Physical  activity:     Days per week: Not on file     Minutes per session: Not on file     Stress: Not on file   Relationships     Social connections:     Talks on phone: Not on file     Gets together: Not on file     Attends Confucianist service: Not on file     Active member of club or organization: Not on file     Attends meetings of clubs or organizations: Not on file     Relationship status: Not on file     Intimate partner violence:     Fear of current or ex partner: Not on file     Emotionally abused: Not on file     Physically abused: Not on file     Forced sexual activity: Not on file   Other Topics Concern      Service No     Blood Transfusions No     Caffeine Concern No     Occupational Exposure Yes     Comment: [employer], and [employer]     Hobby Hazards No     Sleep Concern No     Stress Concern No     Weight Concern No     Special Diet No     Back Care No     Exercise No     Bike Helmet Not Asked     Seat Belt Yes     Self-Exams Not Asked   Social History Narrative     Not on file     Past Medical History:   Diagnosis Date     Other, mixed, or unspecified nondependent drug abuse, in remission     History of IV drug use, methamphetamine and other drugs.       Septic arthritis of hip (H)      Past Surgical History:   Procedure Laterality Date     HC STRABISMUS SURG,TWO HORIZ MUSCLE  06    LT     LASER SURGERY OF EYE       Family History   Problem Relation Age of Onset     Multiple Sclerosis Mother      Uterine Cancer Mother      Diabetes Maternal Grandmother      Lung Cancer Maternal Grandmother         lung cancer,heavy smoker     Cerebrovascular Disease Maternal Grandfather      Eye Disorder Maternal Aunt         strabismus     Heart Disease Paternal Grandfather         had major heart attack and      Eye Disorder Other         cousin- strabismus     Lung Cancer Maternal Uncle      Hypertension No family hx of      Breast Cancer No family hx of      Cancer - colorectal  No family hx of      Prostate Cancer No family hx of      Lipids No family hx of      Neurologic Disorder No family hx of      Respiratory No family hx of      Thyroid Disease No family hx of      Exam:   /85   Pulse 84   Temp 97  F (36.1  C) (Tympanic)   Resp 16   Wt 106.6 kg (235 lb)   SpO2 95%   BMI 32.78 kg/m    GENERAL APPEARANCE: Well developed, well nourished, alert, and in no apparent distress.  EYES: PERRL, EOMI  HENT: Nasal mucosa with no edema and no hyperemia. No nasal polyps.  EARS: Canals clear, TMs normal  MOUTH: Oral mucosa is moist, without any lesions, no tonsillar enlargement, no oropharyngeal exudate.  NECK: supple, no masses, no thyromegaly.  LYMPHATICS: No significant axillary, cervical, or supraclavicular nodes.  RESP: normal percussion, good air flow throughout.  No crackles. No rhonchi. No wheezes.  CV: Normal S1, S2, regular rhythm, normal rate. No murmur.  No rub. No gallop. No LE edema.   ABDOMEN:  Bowel sounds normal, soft, nontender, no HSM or masses.   MS: extremities normal. No clubbing. No cyanosis.  SKIN: no rash on limited exam  NEURO: Mentation intact, speech normal, normal strength and tone, normal gait and stance  PSYCH: mentation appears normal. 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 Options are surveillance vs surgical resection   He is overwhelmed with divorce right now  Exertional dyspnea and cough - PFTs normal     Follow up with thoracic surgery Dr Francis at the end of March, after next legal proceedings.

## 2020-01-13 NOTE — PATIENT INSTRUCTIONS
I think the tumor is benign but we should move towards removing it.   I will arrange for consult with the surgeon in late March to discuss surgery to remove it.       Dr. Francis scheduled March 30, 2020    Mee Delaney RN, BSN, [facility]  Patient Care Coordinator  [facility]  [phone]

## 2020-01-15 NOTE — TELEPHONE ENCOUNTER
RECORDS STATUS - ALL OTHER DIAGNOSIS      RECORDS RECEIVED FROM: Epic/   DATE RECEIVED: 3/30/2020   NOTES STATUS DETAILS   OFFICE NOTE from referring provider     OFFICE NOTE from medical oncologist Complete Epic     Oncology Visit 6/10/2019    DISCHARGE SUMMARY from hospital N/A    DISCHARGE REPORT from the ER Complete [facility] 4/25/2019    OPERATIVE REPORT Complete 6/24/2019 PFT    MEDICATION LIST     CLINICAL TRIAL TREATMENTS TO DATE     LABS     PATHOLOGY REPORTS N/A    ANYTHING RELATED TO DIAGNOSIS Complete Labs last updated on 4/25/2019    GENONOMIC TESTING     TYPE:     IMAGING (NEED IMAGES & REPORT)     CT SCANS Complete PACS 12/18/2019 5/16/2019      MRI     MAMMO     ULTRASOUND     PET       Action    Action Taken 1/15/2020 4:27pm     I called pt David to check on any outside records. Per pt all records are internal

## 2020-03-25 ENCOUNTER — PATIENT OUTREACH (OUTPATIENT)
Dept: ONCOLOGY | Facility: CLINIC | Age: 40
End: 2020-03-25

## 2020-03-25 NOTE — PROGRESS NOTES
Per Dr. Francis, he would like David's telephone appointment for 3/30 cancelled until he can be seen as a video visit.     Writer called David and updated him. David verbalized understanding. David reported he would like to update Dr. Nazario because his impression from her visit in January was that it was more urgent.     Writer explained that Dr. Francis communicated he did not feel the visit was urgent but requested a video visit rather than a telephone visit. Writer will keep David updated on when video visits will be made available to patients. David verbalized understanding and is in agreement with the plan.     Mee Delaney, RN, BSN, [facility]  Patient Care Coordinator  [facility]  [phone]

## 2020-03-30 ENCOUNTER — PRE VISIT (OUTPATIENT)
Dept: ONCOLOGY | Facility: CLINIC | Age: 40
End: 2020-03-30

## 2020-03-31 NOTE — PROGRESS NOTES
Per Dr. Francis, a video visit is recommended. Writer forwarded request to TOMI Mccall at [facility] for scheduling thoracic surgery consult with Dr. Francis as a video visit.   Mee Delaney RN, BSN, [facility]  Patient Care Coordinator  [facility]  [phone]

## 2020-04-06 NOTE — TELEPHONE ENCOUNTER
ONCOLOGY INTAKE: Records Information      APPT INFORMATION: 4/14/20 - [facility] - [facility]  Referring provider:  NIRU Nazario  Referring provider s clinic:  [facility]  Reason for visit/diagnosis:  mass of pleura  Has patient been notified of appointment date and time?: Yes    RECORDS INFORMATION:  Were the records received with the referral (via [employer])? Internal referral    Has patient been seen for any external appt for this diagnosis? No    If yes, where? NA    Has patient had any imaging or procedures outside of [facility] for this condition? No      If Yes, where? NA    ADDITIONAL INFORMATION:  Scheduled via IB from Stefany gentile

## 2020-04-14 ENCOUNTER — VIRTUAL VISIT (OUTPATIENT)
Dept: SURGERY | Facility: CLINIC | Age: 40
End: 2020-04-14
Attending: THORACIC SURGERY (CARDIOTHORACIC VASCULAR SURGERY)
Payer: COMMERCIAL

## 2020-04-14 ENCOUNTER — PRE VISIT (OUTPATIENT)
Dept: ONCOLOGY | Facility: CLINIC | Age: 40
End: 2020-04-14

## 2020-04-14 DIAGNOSIS — J94.8 MASS OF PLEURA: ICD-10-CM

## 2020-04-14 PROCEDURE — 99214 OFFICE O/P EST MOD 30 MIN: CPT | Mod: 95 | Performed by: THORACIC SURGERY (CARDIOTHORACIC VASCULAR SURGERY)

## 2020-04-14 PROCEDURE — 40000114 ZZH STATISTIC NO CHARGE CLINIC VISIT

## 2020-04-14 NOTE — PROGRESS NOTES
"David Crane is a 40 year old male who is being evaluated via a billable telephone visit.      Please call patient on Home phone.     The patient has been notified of following:     \"This telephone visit will be conducted via a call between you and your physician/provider. We have found that certain health care needs can be provided without the need for a physical exam.  This service lets us provide the care you need with a short phone conversation.  If a prescription is necessary we can send it directly to your pharmacy.  If lab work is needed we can place an order for that and you can then stop by our lab to have the test done at a later time.    Telephone visits are billed at different rates depending on your insurance coverage. During this emergency period, for some insurers they may be billed the same as an in-person visit.  Please reach out to your insurance provider with any questions.    If during the course of the call the physician/provider feels a telephone visit is not appropriate, you will not be charged for this service.\"     Physician has received verbal consent for a Telephone Visit from the patient? Yes    How would you like to obtain your AVS? [place]    David Crane complains of    Chief Complaint   Patient presents with     Telephone     TELEPHONE VISIT; MASS OF PLEURA        Allergies reviewed: Yes  Medications reviewed: Yes    Medications: Medication refills not needed today.  Pharmacy name entered into [employer]:    [id number] - [place], [place] - [address]  [id number] - [place], [place] - [address] [id number] - [place], [place] - [address]  AT [facility] [address] [facility]      Opal Barth CMA April 14, 2020  1:21 PM     ALLERGIES  No known drug allergies    Additional provider notes:   THORACIC SURGERY - NEW PATIENT OFFICE VISIT      I saw Mr. Crane at Dr. Nazario s request in consultation for the " evaluation and treatment of a right pleural nodule.     HPI  David Crane is a 40 year old man who was incidentally found to have a right pleural nodule during workup for kidney stones in .  He has no chest wall pain.  He can walk at least two blocks or climb two flights of stairs.  He is moderately active.  He has no weight loss.    Previsit Tests   CT chest [id number]:    4.4 x 2.1 cm    CT chest 2019:    3.4 x 1.7 cm    CT A/P 2019:    3.4 x 1.8 cm    PMH  Past Medical History:   Diagnosis Date     Other, mixed, or unspecified nondependent drug abuse, in remission     History of IV drug use, methamphetamine and other drugs.       Septic arthritis of hip (H)          PSH    Past Surgical History:   Procedure Laterality Date     HC STRABISMUS SURG,TWO HORIZ MUSCLE  06    LT     LASER SURGERY OF EYE          ETOH: None  TOB:   ppd x 9 years.  Quit 10 years ago  He is a former methamphetamine user, many years ago.      Physical examination    From a personal perspective, he is going through a divorce and they have a toddler.  He has partial custody for now.  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 Crane.  He now works in an office job in the same field. His grandmother  of stage IV lung cancer and many years prior, also had a benign nodule taken out, so this causes him concern.    IMPRESSION (J94.8) Mass of pleura    This is a 40 year old man with a pleural mass that is growing.  Based on its appearance, it could be a solitary fibrous tumor of the pleura, or another benign lesion like a schwannoma. A malignancy seems less likely, but remains on the differential.    PLAN  I spent a total of 25 minutes with Mr. Crane, more than 50% of which were spent in counseling, coordination of care, and face-to-face time. I reviewed the plan as follows:  It is my professional judgment, in conjunction with  the current COVID-19 pandemic recommended restrictions on elective procedures, that this procedure can safely be deferred until a later date which may be beyond the typical treatment period/window. I have engaged in a discussion with the patient about the need for this deferral, explained why the procedure can be safely deferred, the potential risks associated with the deferral, and answered all of the patients questions. The patient has also been informed that the decision to defer this procedure can be re-evaluated if there is a change in their condition/symptoms.   Mr. Crane would also like to defer at this time as he navigates his divorce.  I explained that the reason for resection is that these benign tumors are space occupying lesions and can grow quite large.  Procedure planned: Right VATS resection of pleural tumor  Consent: Closer to operation  Necessary Preop Tests & Appointments: PCP closer to an operation.  Ct chest early June 2020.  He will follow up at that time to make further management decisions.  Regional Anesthesia Plan: Intercostal nerve block to be administered by surgeon      All questions were answered and David Crane was in agreement with the plan.  I appreciate the opportunity to participate in the care of your patient and will keep you updated.  Sincerely,     Cedrick Francis    Phone call duration: 25 minutes

## 2020-07-20 ENCOUNTER — HOSPITAL ENCOUNTER (OUTPATIENT)
Dept: CT IMAGING | Facility: CLINIC | Age: 40
Discharge: HOME | End: 2020-07-20
Attending: THORACIC SURGERY (CARDIOTHORACIC VASCULAR SURGERY) | Admitting: THORACIC SURGERY (CARDIOTHORACIC VASCULAR SURGERY)
Payer: COMMERCIAL

## 2020-07-20 ENCOUNTER — VIRTUAL VISIT (OUTPATIENT)
Dept: ONCOLOGY | Facility: CLINIC | Age: 40
End: 2020-07-20
Attending: THORACIC SURGERY (CARDIOTHORACIC VASCULAR SURGERY)
Payer: COMMERCIAL

## 2020-07-20 DIAGNOSIS — J94.8 MASS OF PLEURA: Primary | ICD-10-CM

## 2020-07-20 DIAGNOSIS — J94.8 MASS OF PLEURA: ICD-10-CM

## 2020-07-20 PROCEDURE — 25000125 ZZHC RX 250: Performed by: THORACIC SURGERY (CARDIOTHORACIC VASCULAR SURGERY)

## 2020-07-20 PROCEDURE — 99213 OFFICE O/P EST LOW 20 MIN: CPT | Mod: 95 | Performed by: THORACIC SURGERY (CARDIOTHORACIC VASCULAR SURGERY)

## 2020-07-20 PROCEDURE — 40001009 ZZH VIDEO/TELEPHONE VISIT; NO CHARGE

## 2020-07-20 PROCEDURE — 71260 CT THORAX DX C+: CPT

## 2020-07-20 PROCEDURE — 25000128 H RX IP 250 OP 636: Performed by: THORACIC SURGERY (CARDIOTHORACIC VASCULAR SURGERY)

## 2020-07-20 RX ORDER — IOPAMIDOL 755 MG/ML
500 INJECTION, SOLUTION INTRAVASCULAR ONCE
Status: COMPLETED | OUTPATIENT
Start: 2020-07-20 | End: 2020-07-20

## 2020-07-20 RX ADMIN — SODIUM CHLORIDE 60 ML: 9 INJECTION, SOLUTION INTRAVENOUS at 09:58

## 2020-07-20 RX ADMIN — IOPAMIDOL 80 ML: 755 INJECTION, SOLUTION INTRAVENOUS at 09:58

## 2020-07-20 NOTE — PROGRESS NOTES
"David Crane is a 40 year old male who is being evaluated via a billable video visit.      The patient has been notified of following:     \"This video visit will be conducted via a call between you and your physician/provider. We have found that certain health care needs can be provided without the need for an in-person physical exam.  This service lets us provide the care you need with a video conversation.  If a prescription is necessary we can send it directly to your pharmacy.  If lab work is needed we can place an order for that and you can then stop by our lab to have the test done at a later time.    Video visits are billed at different rates depending on your insurance coverage.  Please reach out to your insurance provider with any questions.    If during the course of the call the physician/provider feels a video visit is not appropriate, you will not be charged for this service.\"    Patient has given verbal consent for Video visit? Yes  How would you like to obtain your AVS? MyChart  If you are dropped from the video visit, the video invite should be resent to: TEXT [phone]  Will anyone else be joining your video visit? No        Video-Visit Details    Type of service:  Video Visit    Video Start Time: 11:43 AM  Video End Time: 11:52 AM     Originating Location (pt. Location): Home    Distant Location (provider location):  [facility]     Platform used for Video Visit: [facility]     THORACIC SURGERY FOLLOW UP VISIT    Dear Dr. Gaines,  I saw David Crane in follow-up today. The clinical summary follows:    INTERVAL STUDIES  7/20/2020 CT chest:    Pleural based mass 4.9 x 2.6 cm    CT chest [id number]:    4.4 x 2.1 cm     CT chest 5/16/2019:    3.4 x 1.7 cm     CT A/P 4/25/2019:    3.4 x 1.8 cm    PFT:  FEV1: 4.36 L (98%)  DLCO:  35.48 (108%)    ETOH: None  TOB:  1/4 ppd x 9 years.  Quit 10 years ago  He is a former methamphetamine user, many years ago.      SUBJECTIVE  David Crane is a 40 " year old man who was incidentally found to have a right pleural nodule during workup for kidney stones in .  He has no chest wall pain.  He can walk at least two blocks or climb two flights of stairs.  He is moderately active.  He has no weight loss.    From a personal perspective, he is going through a divorce and they have a toddler. 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 His grandmother  of stage IV lung cancer and many years prior, also had a benign nodule taken out, so this causes him concern.    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 A malignancy seems less likely, but remains on the differential.    PLAN  I spent a total of 10 minutes with David Crane, more than 50% of which were spent in counseling, coordination of care, and face-to-face time. I reviewed the plan as follows:  I recommend surgical removal of the pleural based mass, particularly as it continues to grow slowly.  He understands and is in agreement, but would like to wait another 3 months as his life is settling after the divorce.  He also needs to check with his work regarding their leave/disability policy.  I explained that he would be limited from strenuous activity for 4 weeks and could take up to 8 weeks to recover.    Procedure planned: Right VATS resection of pleural tumor  I discussed the risks and benefits of the operation, including obtaining a diagnosis and removing the mass. The risks include bleeding, infection, prolonged air leak, DVT and PE, and death.  There is also a risk of prolonged pain, which could require further treatment.  Postoperative bleeding (rare) could require a return to the OR.   Consent: Pending  Necessary Preop Tests & Appointments: PCP preop evaluation prior to surgery.  CT chest in 3 months and follow up  Regional Anesthesia Plan: Intercostal nerve block to be administered by surgeon  All questions were answered and the patient and present family were in agreement with the plan.  I appreciate the opportunity to participate in the care of your patient and will keep you updated.  Sincerely,     Cedrick Francis MD

## 2020-07-20 NOTE — LETTER
"    7/20/2020         RE: David Crane  [address]  [place] [address]        Dear Colleague,    Thank you for referring your patient, David Crane, to the [facility]. Please see a copy of my visit note below.    David Crane is a 40 year old male who is being evaluated via a billable video visit.      The patient has been notified of following:     \"This video visit will be conducted via a call between you and your physician/provider. We have found that certain health care needs can be provided without the need for an in-person physical exam.  This service lets us provide the care you need with a video conversation.  If a prescription is necessary we can send it directly to your pharmacy.  If lab work is needed we can place an order for that and you can then stop by our lab to have the test done at a later time.    Video visits are billed at different rates depending on your insurance coverage.  Please reach out to your insurance provider with any questions.    If during the course of the call the physician/provider feels a video visit is not appropriate, you will not be charged for this service.\"    Patient has given verbal consent for Video visit? Yes  How would you like to obtain your AVS? MyChart  If you are dropped from the video visit, the video invite should be resent to: TEXT [phone]  Will anyone else be joining your video visit? No        Video-Visit Details    Type of service:  Video Visit    Video Start Time: 11:43 AM  Video End Time: 11:52 AM     Originating Location (pt. Location): Home    Distant Location (provider location):  [facility]     Platform used for Video Visit: [facility]     THORACIC SURGERY FOLLOW UP VISIT    Dear Dr. Gaines,  I saw David Crane in follow-up today. The clinical summary follows:    INTERVAL STUDIES  7/20/2020 CT chest:    Pleural based mass 4.9 x 2.6 cm    CT chest [id number]:    4.4 x 2.1 cm     CT chest 5/16/2019:    3.4 x 1.7 " cm     CT A/P 2019:    3.4 x 1.8 cm    PFT:  FEV1: 4.36 L (98%)  DLCO:  35.48 (108%)    ETOH: None  TOB:  / ppd x 9 years.  Quit 10 years ago  He is a former methamphetamine user, many years ago.      SUBJECTIVE  David Crane is a 40 year old man who was incidentally found to have a right pleural nodule during workup for kidney stones in .  He has no chest wall pain.  He can walk at least two blocks or climb two flights of stairs.  He is moderately active.  He has no weight loss.    From a personal perspective, he is going through a divorce and they have a toddler. 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 Crane.  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 His grandmother  of stage IV lung cancer and many years prior, also had a benign nodule taken out, so this causes him concern.    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 A malignancy seems less likely, but remains on the differential.    PLAN  I spent a total of 10 minutes with David Crane, more than 50% of which were spent in counseling, coordination of care, and face-to-face time. I reviewed the plan as follows:  I recommend surgical removal of the pleural based mass, particularly as it continues to grow slowly.  He understands and is in agreement, but would like to wait another 3 months as his life is settling after the divorce.  He also needs to check with his work regarding their leave/disability policy.  I explained that he would be limited from strenuous activity for 4 weeks and could take up to 8 weeks to recover.    Procedure planned: Right VATS resection of pleural tumor  I discussed the risks and benefits  of the operation, including obtaining a diagnosis and removing the mass. The risks include bleeding, infection, prolonged air leak, DVT and PE, and death.  There is also a risk of prolonged pain, which could require further treatment. Postoperative bleeding (rare) could require a return to the OR.   Consent: Pending  Necessary Preop Tests & Appointments: PCP preop evaluation prior to surgery.  CT chest in 3 months and follow up  Regional Anesthesia Plan: Intercostal nerve block to be administered by surgeon  All questions were answered and the patient and present family were in agreement with the plan.  I appreciate the opportunity to participate in the care of your patient and will keep you updated.  Sincerely,     Cedrick Francis MD          Again, thank you for allowing me to participate in the care of your patient.        Sincerely,        Cedrick Francis MD

## 2020-07-20 NOTE — LETTER
"    7/20/2020         RE: David Crane  [address]  [facility] [address]        Dear Colleague,    Thank you for referring your patient, David Crane, to the [facility]. Please see a copy of my visit note below.    David Crane is a 40 year old male who is being evaluated via a billable video visit.      The patient has been notified of following:     \"This video visit will be conducted via a call between you and your physician/provider. We have found that certain health care needs can be provided without the need for an in-person physical exam.  This service lets us provide the care you need with a video conversation.  If a prescription is necessary we can send it directly to your pharmacy.  If lab work is needed we can place an order for that and you can then stop by our lab to have the test done at a later time.    Video visits are billed at different rates depending on your insurance coverage.  Please reach out to your insurance provider with any questions.    If during the course of the call the physician/provider feels a video visit is not appropriate, you will not be charged for this service.\"    Patient has given verbal consent for Video visit? Yes  How would you like to obtain your AVS? MyChart  If you are dropped from the video visit, the video invite should be resent to: TEXT [phone]  Will anyone else be joining your video visit? No        Video-Visit Details    Type of service:  Video Visit    Video Start Time: 11:43 AM  Video End Time: 11:52 AM     Originating Location (pt. Location): Home    Distant Location (provider location):  [facility]     Platform used for Video Visit: [facility]     THORACIC SURGERY FOLLOW UP VISIT    Dear Dr. Gaines,  I saw David Crane in follow-up today. The clinical summary follows:    INTERVAL STUDIES  7/20/2020 CT chest:    Pleural based mass 4.9 x 2.6 cm    CT chest [id number]:    4.4 x 2.1 cm     CT chest 5/16/2019:    3.4 x 1.7 " cm     CT A/P 2019:    3.4 x 1.8 cm    PFT:  FEV1: 4.36 L (98%)  DLCO:  35.48 (108%)    ETOH: None  TOB:  / ppd x 9 years.  Quit 10 years ago  He is a former methamphetamine user, many years ago.      SUBJECTIVE  David Crane is a 40 year old man who was incidentally found to have a right pleural nodule during workup for kidney stones in .  He has no chest wall pain.  He can walk at least two blocks or climb two flights of stairs.  He is moderately active.  He has no weight loss.    From a personal perspective, he is going through a divorce and they have a toddler. 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 Crane.  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 His grandmother  of stage IV lung cancer and many years prior, also had a benign nodule taken out, so this causes him concern.    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 A malignancy seems less likely, but remains on the differential.    PLAN  I spent a total of 10 minutes with David Crane, more than 50% of which were spent in counseling, coordination of care, and face-to-face time. I reviewed the plan as follows:  I recommend surgical removal of the pleural based mass, particularly as it continues to grow slowly.  He understands and is in agreement, but would like to wait another 3 months as his life is settling after the divorce.  He also needs to check with his work regarding their leave/disability policy.  I explained that he would be limited from strenuous activity for 4 weeks and could take up to 8 weeks to recover.    Procedure planned: Right VATS resection of pleural tumor  I discussed the risks and benefits  of the operation, including obtaining a diagnosis and removing the mass. The risks include bleeding, infection, prolonged air leak, DVT and PE, and death.  There is also a risk of prolonged pain, which could require further treatment. Postoperative bleeding (rare) could require a return to the OR.   Consent: Pending  Necessary Preop Tests & Appointments: PCP preop evaluation prior to surgery.  CT chest in 3 months and follow up  Regional Anesthesia Plan: Intercostal nerve block to be administered by surgeon  All questions were answered and the patient and present family were in agreement with the plan.  I appreciate the opportunity to participate in the care of your patient and will keep you updated.  Sincerely,     Cedrick Francis MD          Again, thank you for allowing me to participate in the care of your patient.        Sincerely,        Cedrick Francis MD

## 2020-07-22 NOTE — PATIENT INSTRUCTIONS
CT scheduled 10/19/20  Video visit with Dr. Francis scheduled 10/19/20  Mee Delaney, RN, BSN, [facility]  Patient Care Coordinator  [facility]  [phone]

## 2020-08-14 ENCOUNTER — PATIENT OUTREACH (OUTPATIENT)
Dept: ONCOLOGY | Facility: CLINIC | Age: 40
End: 2020-08-14

## 2020-08-14 NOTE — PROGRESS NOTES
Writer called David back. David reports he would like to get scheduled for his surgery within the next couple of weeks. Writer will update Dr. Francis to see if he has any availability and return David's call.     Mee Delaney RN, BSN, PRATIK  RN Care Coordinator  [facility]  [phone]

## 2020-08-14 NOTE — PROGRESS NOTES
Received call from pt who states he would like to speak with Jonathan Caban to schedule surgery with Dr Francis.  She is not available at time of call and pt informed  that writer will send message to Mee and to return call.  Pt aware of plan.  He will be at work and will do his best to  call.    Lucy Stevenson RN, BSN, OCN

## 2020-08-17 ENCOUNTER — PREP FOR PROCEDURE (OUTPATIENT)
Dept: SURGERY | Facility: CLINIC | Age: 40
End: 2020-08-17

## 2020-08-17 DIAGNOSIS — J94.8 PLEURAL MASS: Primary | ICD-10-CM

## 2020-08-17 RX ORDER — CEFAZOLIN SODIUM 2 G/50ML
2 SOLUTION INTRAVENOUS
Status: CANCELLED | OUTPATIENT
Start: 2020-08-17

## 2020-08-17 RX ORDER — CEFAZOLIN SODIUM 1 G/50ML
1 INJECTION, SOLUTION INTRAVENOUS SEE ADMIN INSTRUCTIONS
Status: CANCELLED | OUTPATIENT
Start: 2020-08-17

## 2020-08-17 NOTE — PROGRESS NOTES
Cedrick Francis MD Anderson, Amanda E, RN; Caty Gonzalez    Cc: P  Cancer Clinic Rn Pool    Caller: Unspecified (3 days ago,  1:56 PM)               I placed a request for surgery.  He will need to see his PCP prior to surgery.  Caty, if the case is in the next 6 weeks, he does not need a new CT prior.     Thank you,     Cedrick Bahenar called and spoke with David. Writer updated David that the OR  will be contacting him with the OR date/time and he will need a PCP pre-op visit.  explained I will be mailing out pre-op instructions to him and once he receives the information in the mail to call me to go over the instructions over the phone.     David verbalized understanding and is in agreement with the plan.     Mee Delaney RN, BSN, SIMINM  RN Care Coordinator  [facility]  [phone]

## 2020-08-27 ENCOUNTER — ONCOLOGY VISIT (OUTPATIENT)
Dept: ONCOLOGY | Facility: CLINIC | Age: 40
End: 2020-08-27
Attending: THORACIC SURGERY (CARDIOTHORACIC VASCULAR SURGERY)
Payer: COMMERCIAL

## 2020-08-27 ENCOUNTER — TELEPHONE (OUTPATIENT)
Dept: SURGERY | Facility: CLINIC | Age: 40
End: 2020-08-27

## 2020-08-27 DIAGNOSIS — Z11.59 ENCOUNTER FOR SCREENING FOR OTHER VIRAL DISEASES: Primary | ICD-10-CM

## 2020-08-27 DIAGNOSIS — J94.8 MASS OF PLEURA: Primary | ICD-10-CM

## 2020-08-27 PROCEDURE — 99207 ZZC NO CHARGE NURSE ONLY: CPT

## 2020-08-27 NOTE — LETTER
8/27/2020         RE: David Crane  [address]  [place] [address]        Dear Colleague,    Thank you for referring your patient, David Crane, to the [facility]. Please see a copy of my visit note below.    Patient was called for pre-op instructions for Robot-assisted right thoracoscopic resection of pleural mass. Surgery is currently scheduled 9/23/20 with Dr. Francis. Handouts given and reviewed. The patient was instructed to stop aspirin, ibuprofen, naproxen, NSAIDS, fish oil/flax seed oil, Vit E one week before surgery.  NPO guidelines and showering instructions given. Patient will purchase CHG soap for pre-op showering. Patient is aware he will have an overnoc hospital stay and will need a  to and from the hospital. The patient was instructed to notify the provider if there are any signs of a cold/flu prior to surgery. On the day before surgery, the patient was instructed to avoid smoking, chewing tobacco, or drinking alcohol.     Post-op: Reviewed the following points of when to contact the MD: Signs of infection including - Temp >/= 100.4, chills, bloody drainage from surgical site, warmth/redness at surgical site; Increased drainage from surgical site; Difficulty breathing/SOB; Dizziness/light-headedness; persistent cough. Is aware s/he will return home with narcotic pain med and should not drive until off medication. If post-op pain is not well controlled by prescribed pain med they should call the clinic. The patient was notified of post-op restrictions including: no heavy lifting >/= 15 lbs for 2 weeks. David was notified that his specific restrictions may differ than the ones discussed today; no drastic changes in air pressure (no flying or scuba diving). Patient is aware he needs to remain well hydrated and eat a protein-rich diet post op. Verbalized understanding of potential post-op complications.         David has a pre-op H&P scheduled 9/22. Patient  given contact information and will reach out to care coordinator or MD with additional questions or concerns. Pt instructed to call with further questions or concerns. Patient verbalized understanding and is in agreement with this plan.    Mee Delaney, RN, BSN, PRATIK  RN Care Coordinator  [facility]  [phone]        Again, thank you for allowing me to participate in the care of your patient.        Sincerely,        [facility] Nurse

## 2020-08-27 NOTE — TELEPHONE ENCOUNTER
Spoke with patient to schedule procedure with Dr. Cedrick Francis    Procedure was scheduled on 09/23 at [facility]  Patient will have H&P at [facility]    Patient is aware a COVID-19 test is needed before their procedure. The test should be with-in 4 days of their procedure.   Test Details: Date 09/21  Location  [facility]     Patient is aware a / is needed day of surgery.   Surgery letter was sent via [employer] and Mail, patient has my direct contact information for any further questions.

## 2020-08-27 NOTE — PROGRESS NOTES
Patient was called for pre-op instructions for Robot-assisted right thoracoscopic resection of pleural mass. Surgery is currently scheduled 9/23/20 with Dr. Francis. Handouts given and reviewed. The patient was instructed to stop aspirin, ibuprofen, naproxen, NSAIDS, fish oil/flax seed oil, Vit E one week before surgery.  NPO guidelines and showering instructions given. Patient will purchase CHG soap for pre-op showering. Patient is aware he will have an overnoc hospital stay and will need a  to and from the hospital. The patient was instructed to notify the provider if there are any signs of a cold/flu prior to surgery. On the day before surgery, the patient was instructed to avoid smoking, chewing tobacco, or drinking alcohol.     Post-op: Reviewed the following points of when to contact the MD: Signs of infection including - Temp >/= 100.4, chills, bloody drainage from surgical site, warmth/redness at surgical site; Increased drainage from surgical site; Difficulty breathing/SOB; Dizziness/light-headedness; persistent cough. Is aware s/he will return home with narcotic pain med and should not drive until off medication. If post-op pain is not well controlled by prescribed pain med they should call the clinic. The patient was notified of post-op restrictions including: no heavy lifting >/= 15 lbs for 2 weeks. David was notified that his specific restrictions may differ than the ones discussed today; no drastic changes in air pressure (no flying or scuba diving). Patient is aware he needs to remain well hydrated and eat a protein-rich diet post op. Verbalized understanding of potential post-op complications.         David has a pre-op H&P scheduled 9/22. Patient given contact information and will reach out to care coordinator or MD with additional questions or concerns. Pt instructed to call with further questions or concerns. Patient verbalized understanding and is in agreement with this plan.    Mee  Rhett, RN, BSN, PRATIK  RN Care Coordinator  [facility]  [phone]

## 2020-09-21 ENCOUNTER — ANESTHESIA EVENT (OUTPATIENT)
Dept: SURGERY | Facility: CLINIC | Age: 40
DRG: 168 | End: 2020-09-21
Payer: COMMERCIAL

## 2020-09-21 DIAGNOSIS — Z11.59 ENCOUNTER FOR SCREENING FOR OTHER VIRAL DISEASES: ICD-10-CM

## 2020-09-21 LAB
SARS-COV-2 RNA SPEC QL NAA+PROBE: NOT DETECTED
SPECIMEN SOURCE: NORMAL

## 2020-09-21 PROCEDURE — U0003 INFECTIOUS AGENT DETECTION BY NUCLEIC ACID (DNA OR RNA); SEVERE ACUTE RESPIRATORY SYNDROME CORONAVIRUS 2 (SARS-COV-2) (CORONAVIRUS DISEASE [COVID-19]), AMPLIFIED PROBE TECHNIQUE, MAKING USE OF HIGH THROUGHPUT TECHNOLOGIES AS DESCRIBED BY CMS-2020-01-R: HCPCS | Performed by: THORACIC SURGERY (CARDIOTHORACIC VASCULAR SURGERY)

## 2020-09-22 ENCOUNTER — OFFICE VISIT (OUTPATIENT)
Dept: FAMILY MEDICINE | Facility: CLINIC | Age: 40
End: 2020-09-22
Payer: COMMERCIAL

## 2020-09-22 VITALS
BODY MASS INDEX: 33.32 KG/M2 | TEMPERATURE: 98.1 F | OXYGEN SATURATION: 97 % | DIASTOLIC BLOOD PRESSURE: 86 MMHG | HEART RATE: 80 BPM | SYSTOLIC BLOOD PRESSURE: 138 MMHG | RESPIRATION RATE: 16 BRPM | HEIGHT: 71 IN | WEIGHT: 238 LBS

## 2020-09-22 DIAGNOSIS — R03.0 ELEVATED BLOOD PRESSURE READING WITHOUT DIAGNOSIS OF HYPERTENSION: ICD-10-CM

## 2020-09-22 DIAGNOSIS — Z01.818 PREOP GENERAL PHYSICAL EXAM: Primary | ICD-10-CM

## 2020-09-22 DIAGNOSIS — J94.8 PLEURAL MASS: ICD-10-CM

## 2020-09-22 PROCEDURE — 99214 OFFICE O/P EST MOD 30 MIN: CPT | Performed by: PHYSICIAN ASSISTANT

## 2020-09-22 ASSESSMENT — MIFFLIN-ST. JEOR: SCORE: 2021.69

## 2020-09-22 NOTE — PATIENT INSTRUCTIONS

## 2020-09-22 NOTE — PROGRESS NOTES
[facility]  [address] [facility] [address] [phone]  Dept: [phone]    PRE-OP EVALUATION:  Today's date: 2020    David Crane (: 1980) presents for pre-operative evaluation assessment as requested by Dr. Cedrick Francis.  He requires evaluation and anesthesia risk assessment prior to undergoing surgery/procedure for treatment of mass removal from lung tissue.    Fax number for surgical facility:   Primary Physician: Sonia Paterson [facility]  Type of Anesthesia Anticipated: General    Preop Questionnnaire:  Pre-op Questionnaire 2020   Surgery Location: [facility]   Surgeon: Cedrick Francis   Surgery/Procedure: Removing mass from lung tissue   Surgery Date: 2020   Time of Surgery: 915am   Where patient plans to recover: At home with family   1. Have you ever had a heart attack or stroke? No   2. Have you ever had surgery on your heart or blood vessels, such as a stent placement, a coronary artery bypass, or surgery on an artery in your head, neck, heart, or legs? No   3. Do you have chest pain with activity? No   4. Do you have a history of  heart failure? No   5. Do you currently have a cold, bronchitis or symptoms of other infection? No   6. Do you have a cough, shortness of breath, or wheezing? No   7. Do you or anyone in your family have previous history of blood clots? No   8. Do you or does anyone in your family have a serious bleeding problem such as prolonged bleeding following surgeries or cuts? No   9. Have you ever had problems with anemia or been told to take iron pills? No   10. Have you had any abnormal blood loss such as black, tarry or bloody stools? No   11. Have you ever had a blood transfusion? No   Are you willing to have a blood transfusion if it is medically needed before, during, or after your surgery? Yes   13. Have you or any of your relatives ever had problems with anesthesia? No   14. Do you have sleep apnea, excessive  snoring or daytime drowsiness? No   15. Do you have any artifical heart valves or other implanted medical devices like a pacemaker, defibrillator, or continuous glucose monitor? No   16. Do you have artificial joints? No   17. Are you allergic to latex? No         HPI:     HPI related to upcoming procedure: Pleural mass incidentally found when undergoing CT for kidney stones. No associated symptoms. Plan for robot-assisted right thorascopic resection of pleural mass.    See problem list for active medical problems.  Problems all longstanding and stable, except as noted/documented.  See ROS for pertinent symptoms related to these conditions.      MEDICAL HISTORY:     Patient Active Problem List    Diagnosis Date Noted     Pleural mass 08/27/2020     Priority: Medium     Added automatically from request for surgery [id number]       Mass of pleura 07/20/2020     Priority: Medium     Exotropia 12/29/2005     Priority: Medium     Problem list name updated by automated process. Provider to review       Tobacco use disorder 12/15/2004     Priority: Medium     MYOPIA [367.1] 12/15/2004     Priority: Medium     uses corrective lenses        Past Medical History:   Diagnosis Date     Other, mixed, or unspecified nondependent drug abuse, in remission     History of IV drug use, methamphetamine and other drugs.       Septic arthritis of hip (H) 2002     Past Surgical History:   Procedure Laterality Date     HC STRABISMUS SURG,TWO HORIZ MUSCLE  01/09/06    LT     LASER SURGERY OF EYE       Current Outpatient Medications   Medication Sig Dispense Refill     albuterol (PROAIR HFA/PROVENTIL HFA/VENTOLIN HFA) 108 (90 Base) MCG/ACT inhaler Inhale 1-2 puffs into the lungs daily as needed for shortness of breath / dyspnea or wheezing 1 Inhaler 6     fluticasone (FLONASE) 50 MCG/ACT nasal spray Spray 1-2 sprays into both nostrils daily 16 g 3     MULTIVITAMIN/MINERAL FORMULA OR TABS 1 TABLET DAILY       OTC products: no recent use of OTC  "ASA, NSAIDS or Steroids and no use of herbal medications or other supplements    Allergies   Allergen Reactions     No Known Drug Allergies       Latex Allergy: NO    Social History     Tobacco Use     Smoking status: Former Smoker     Packs/day: 1.50     Years: 13.00     Pack years: 19.50     Types: Cigarettes     Start date: 6/10/1997     Last attempt to quit: 6/10/2010     Years since quitting: 10.2     Smokeless tobacco: Former User     Types: Snuff     Tobacco comment: rarely smokes 1 with his dad   Substance Use Topics     Alcohol use: Not Currently     History   Drug Use Unknown     Comment: history of        REVIEW OF SYSTEMS:   CONSTITUTIONAL: NEGATIVE for fever, chills, change in weight  INTEGUMENTARY/SKIN: NEGATIVE for worrisome rashes, moles or lesions  EYES: NEGATIVE for vision changes or irritation  ENT/MOUTH: NEGATIVE for ear, mouth and throat problems  RESP: NEGATIVE for significant cough or SOB  BREAST: NEGATIVE for masses, tenderness or discharge  CV: NEGATIVE for chest pain, palpitations or peripheral edema  GI: NEGATIVE for nausea, abdominal pain, heartburn, or change in bowel habits  : NEGATIVE for frequency, dysuria, or hematuria  MUSCULOSKELETAL: NEGATIVE for significant arthralgias or myalgia  NEURO: NEGATIVE for weakness, dizziness or paresthesias  ENDOCRINE: NEGATIVE for temperature intolerance, skin/hair changes  HEME: NEGATIVE for bleeding problems  PSYCHIATRIC: NEGATIVE for changes in mood or affect    EXAM:   /86   Pulse 80   Temp 98.1  F (36.7  C) (Oral)   Resp 16   Ht 1.803 m (5' 11\")   Wt 108 kg (238 lb)   SpO2 97%   BMI 33.19 kg/m      GENERAL APPEARANCE: healthy, alert and no distress     EYES: EOMI,  PERRL     HENT: ear canals and TM's normal and nose and mouth without ulcers or lesions     NECK: no adenopathy, no asymmetry, masses, or scars and thyroid normal to palpation     RESP: lungs clear to auscultation - no rales, rhonchi or wheezes     CV: regular rates and " rhythm, normal S1 S2, no S3 or S4 and no murmur, click or rub     ABDOMEN:  soft, nontender, no HSM or masses and bowel sounds normal     MS: extremities normal- no gross deformities noted, no evidence of inflammation in joints, FROM in all extremities.     SKIN: no suspicious lesions or rashes     NEURO: Normal strength and tone, sensory exam grossly normal, mentation intact and speech normal     PSYCH: mentation appears normal. and affect normal/bright     LYMPHATICS: No cervical adenopathy    DIAGNOSTICS:   No labs required  EKG: Not indicated due to non-vascular surgery and low risk of event (age <65 and without cardiac risk factors)    Recent Labs   Lab Test 04/25/19  0028 04/22/19  1014   HGB 14.7 14.9    251    141   POTASSIUM 3.3* 3.5   CR 0.88 0.91        IMPRESSION:   Reason for surgery/procedure: Pleural mass  Diagnosis/reason for consult: Pre-op for robot-assisted right thorascopic resection of pleural mass.    The proposed surgical procedure is considered LOW risk.    REVISED CARDIAC RISK INDEX  The patient has the following serious cardiovascular risks for perioperative complications such as (MI, PE, VFib and 3  AV Block):  No serious cardiac risks  INTERPRETATION: 0 risks: Class I (very low risk - 0.4% complication rate)    The patient has the following additional risks for perioperative complications:  No identified additional risks      ICD-10-CM    1. Preop general physical exam  Z01.818    2. Pleural mass  J94.8    3. Elevated blood pressure reading without diagnosis of hypertension  R03.0     Discussed monitoring, working on weight loss, exercise, low salt diet. Follow-up to recheck blood pressure in 1 month.       RECOMMENDATIONS:   --Advised to avoid NSAIDS (Motrin, Ibuprofen, Aleve, Naprosyn) for one week prior to surgery. If needed, Tylenol or Acetaminophen are okay to use.  --Advised to avoid supplements for one week prior to surgery.  --Knows where to be and when to be there  for surgery. Knows when to be NPO.  --Pain medications, time off from work and FMLA following surgery deferred to surgeon.    --Patient is on no chronic medications    APPROVAL GIVEN to proceed with proposed procedure, without further diagnostic evaluation       Signed Electronically by: Svitlana Wakefield PA-C    Copy of this evaluation report is provided to requesting physician.    [facility] Preop Guidelines    Revised Cardiac Risk Index

## 2020-09-23 ENCOUNTER — APPOINTMENT (OUTPATIENT)
Dept: GENERAL RADIOLOGY | Facility: CLINIC | Age: 40
DRG: 168 | End: 2020-09-23
Attending: THORACIC SURGERY (CARDIOTHORACIC VASCULAR SURGERY)
Payer: COMMERCIAL

## 2020-09-23 ENCOUNTER — ANESTHESIA (OUTPATIENT)
Dept: SURGERY | Facility: CLINIC | Age: 40
DRG: 168 | End: 2020-09-23
Payer: COMMERCIAL

## 2020-09-23 ENCOUNTER — HOSPITAL ENCOUNTER (INPATIENT)
Facility: CLINIC | Age: 40
LOS: 1 days | Discharge: HOME | DRG: 168 | End: 2020-09-24
Attending: THORACIC SURGERY (CARDIOTHORACIC VASCULAR SURGERY) | Admitting: THORACIC SURGERY (CARDIOTHORACIC VASCULAR SURGERY)
Payer: COMMERCIAL

## 2020-09-23 DIAGNOSIS — J94.8 PLEURAL MASS: ICD-10-CM

## 2020-09-23 DIAGNOSIS — F17.200 TOBACCO USE DISORDER: Primary | ICD-10-CM

## 2020-09-23 LAB
ABO + RH BLD: NORMAL
ABO + RH BLD: NORMAL
BLD GP AB SCN SERPL QL: NORMAL
BLOOD BANK CMNT PATIENT-IMP: NORMAL
CREAT SERPL-MCNC: 0.81 MG/DL (ref 0.66–1.25)
GFR SERPL CREATININE-BSD FRML MDRD: >90 ML/MIN/{1.73_M2}
GLUCOSE BLDC GLUCOMTR-MCNC: 100 MG/DL (ref 70–99)
HGB BLD-MCNC: 15.7 G/DL (ref 13.3–17.7)
PLATELET # BLD AUTO: 263 10E9/L (ref 150–450)
POTASSIUM SERPL-SCNC: 4.3 MMOL/L (ref 3.4–5.3)
SPECIMEN EXP DATE BLD: NORMAL

## 2020-09-23 PROCEDURE — 25800030 ZZH RX IP 258 OP 636: Performed by: NURSE ANESTHETIST, CERTIFIED REGISTERED

## 2020-09-23 PROCEDURE — 37000008 ZZH ANESTHESIA TECHNICAL FEE, 1ST 30 MIN: Performed by: THORACIC SURGERY (CARDIOTHORACIC VASCULAR SURGERY)

## 2020-09-23 PROCEDURE — 85018 HEMOGLOBIN: CPT | Performed by: ANESTHESIOLOGY

## 2020-09-23 PROCEDURE — 25000128 H RX IP 250 OP 636: Performed by: STUDENT IN AN ORGANIZED HEALTH CARE EDUCATION/TRAINING PROGRAM

## 2020-09-23 PROCEDURE — 25000125 ZZHC RX 250: Performed by: NURSE ANESTHETIST, CERTIFIED REGISTERED

## 2020-09-23 PROCEDURE — 88307 TISSUE EXAM BY PATHOLOGIST: CPT | Performed by: THORACIC SURGERY (CARDIOTHORACIC VASCULAR SURGERY)

## 2020-09-23 PROCEDURE — 85049 AUTOMATED PLATELET COUNT: CPT | Performed by: ANESTHESIOLOGY

## 2020-09-23 PROCEDURE — 0BBF4ZX EXCISION OF RIGHT LOWER LUNG LOBE, PERCUTANEOUS ENDOSCOPIC APPROACH, DIAGNOSTIC: ICD-10-PCS | Performed by: THORACIC SURGERY (CARDIOTHORACIC VASCULAR SURGERY)

## 2020-09-23 PROCEDURE — 25000128 H RX IP 250 OP 636

## 2020-09-23 PROCEDURE — 25000128 H RX IP 250 OP 636: Performed by: NURSE ANESTHETIST, CERTIFIED REGISTERED

## 2020-09-23 PROCEDURE — 25000128 H RX IP 250 OP 636: Performed by: THORACIC SURGERY (CARDIOTHORACIC VASCULAR SURGERY)

## 2020-09-23 PROCEDURE — 25000132 ZZH RX MED GY IP 250 OP 250 PS 637: Performed by: ANESTHESIOLOGY

## 2020-09-23 PROCEDURE — 86900 BLOOD TYPING SEROLOGIC ABO: CPT | Performed by: ANESTHESIOLOGY

## 2020-09-23 PROCEDURE — 8E0W4CZ ROBOTIC ASSISTED PROCEDURE OF TRUNK REGION, PERCUTANEOUS ENDOSCOPIC APPROACH: ICD-10-PCS | Performed by: THORACIC SURGERY (CARDIOTHORACIC VASCULAR SURGERY)

## 2020-09-23 PROCEDURE — 84132 ASSAY OF SERUM POTASSIUM: CPT | Performed by: ANESTHESIOLOGY

## 2020-09-23 PROCEDURE — 71000014 ZZH RECOVERY PHASE 1 LEVEL 2 FIRST HR: Performed by: THORACIC SURGERY (CARDIOTHORACIC VASCULAR SURGERY)

## 2020-09-23 PROCEDURE — 36000086 ZZH SURGERY LEVEL 8 1ST 30 MIN UMMC: Performed by: THORACIC SURGERY (CARDIOTHORACIC VASCULAR SURGERY)

## 2020-09-23 PROCEDURE — 25000132 ZZH RX MED GY IP 250 OP 250 PS 637: Performed by: STUDENT IN AN ORGANIZED HEALTH CARE EDUCATION/TRAINING PROGRAM

## 2020-09-23 PROCEDURE — 86850 RBC ANTIBODY SCREEN: CPT | Performed by: ANESTHESIOLOGY

## 2020-09-23 PROCEDURE — 36000088 ZZH SURGERY LEVEL 8 EA 15 ADDTL MIN - UMMC: Performed by: THORACIC SURGERY (CARDIOTHORACIC VASCULAR SURGERY)

## 2020-09-23 PROCEDURE — 86901 BLOOD TYPING SEROLOGIC RH(D): CPT | Performed by: ANESTHESIOLOGY

## 2020-09-23 PROCEDURE — 00000146 ZZHCL STATISTIC GLUCOSE BY METER IP

## 2020-09-23 PROCEDURE — 82565 ASSAY OF CREATININE: CPT | Performed by: ANESTHESIOLOGY

## 2020-09-23 PROCEDURE — 37000009 ZZH ANESTHESIA TECHNICAL FEE, EACH ADDTL 15 MIN: Performed by: THORACIC SURGERY (CARDIOTHORACIC VASCULAR SURGERY)

## 2020-09-23 PROCEDURE — 40000170 ZZH STATISTIC PRE-PROCEDURE ASSESSMENT II: Performed by: THORACIC SURGERY (CARDIOTHORACIC VASCULAR SURGERY)

## 2020-09-23 PROCEDURE — 12000001 ZZH R&B MED SURG/OB UMMC

## 2020-09-23 PROCEDURE — 40000986 XR CHEST PORT 1 VW

## 2020-09-23 PROCEDURE — 27210794 ZZH OR GENERAL SUPPLY STERILE: Performed by: THORACIC SURGERY (CARDIOTHORACIC VASCULAR SURGERY)

## 2020-09-23 PROCEDURE — 36415 COLL VENOUS BLD VENIPUNCTURE: CPT | Performed by: ANESTHESIOLOGY

## 2020-09-23 PROCEDURE — 88342 IMHCHEM/IMCYTCHM 1ST ANTB: CPT | Performed by: THORACIC SURGERY (CARDIOTHORACIC VASCULAR SURGERY)

## 2020-09-23 PROCEDURE — 25000132 ZZH RX MED GY IP 250 OP 250 PS 637

## 2020-09-23 PROCEDURE — 88341 IMHCHEM/IMCYTCHM EA ADD ANTB: CPT | Performed by: THORACIC SURGERY (CARDIOTHORACIC VASCULAR SURGERY)

## 2020-09-23 PROCEDURE — 25000566 ZZH SEVOFLURANE, EA 15 MIN: Performed by: THORACIC SURGERY (CARDIOTHORACIC VASCULAR SURGERY)

## 2020-09-23 PROCEDURE — 25000128 H RX IP 250 OP 636: Performed by: ANESTHESIOLOGY

## 2020-09-23 RX ORDER — HYDROMORPHONE HYDROCHLORIDE 1 MG/ML
0.5 INJECTION, SOLUTION INTRAMUSCULAR; INTRAVENOUS; SUBCUTANEOUS EVERY 4 HOURS PRN
Status: DISCONTINUED | OUTPATIENT
Start: 2020-09-23 | End: 2020-09-23

## 2020-09-23 RX ORDER — OXYCODONE HYDROCHLORIDE 5 MG/1
5-10 TABLET ORAL EVERY 4 HOURS PRN
Status: DISCONTINUED | OUTPATIENT
Start: 2020-09-23 | End: 2020-09-24 | Stop reason: HOSPADM

## 2020-09-23 RX ORDER — EPHEDRINE SULFATE 50 MG/ML
INJECTION, SOLUTION INTRAMUSCULAR; INTRAVENOUS; SUBCUTANEOUS PRN
Status: DISCONTINUED | OUTPATIENT
Start: 2020-09-23 | End: 2020-09-23

## 2020-09-23 RX ORDER — ONDANSETRON 2 MG/ML
INJECTION INTRAMUSCULAR; INTRAVENOUS PRN
Status: DISCONTINUED | OUTPATIENT
Start: 2020-09-23 | End: 2020-09-23

## 2020-09-23 RX ORDER — SODIUM CHLORIDE, SODIUM LACTATE, POTASSIUM CHLORIDE, CALCIUM CHLORIDE 600; 310; 30; 20 MG/100ML; MG/100ML; MG/100ML; MG/100ML
INJECTION, SOLUTION INTRAVENOUS CONTINUOUS
Status: DISCONTINUED | OUTPATIENT
Start: 2020-09-23 | End: 2020-09-23 | Stop reason: HOSPADM

## 2020-09-23 RX ORDER — IBUPROFEN 600 MG/1
600 TABLET, FILM COATED ORAL EVERY 6 HOURS
Status: DISCONTINUED | OUTPATIENT
Start: 2020-09-23 | End: 2020-09-24 | Stop reason: HOSPADM

## 2020-09-23 RX ORDER — ONDANSETRON 4 MG/1
4 TABLET, ORALLY DISINTEGRATING ORAL EVERY 30 MIN PRN
Status: DISCONTINUED | OUTPATIENT
Start: 2020-09-23 | End: 2020-09-23 | Stop reason: HOSPADM

## 2020-09-23 RX ORDER — ACETAMINOPHEN 325 MG/1
975 TABLET ORAL ONCE
Status: COMPLETED | OUTPATIENT
Start: 2020-09-23 | End: 2020-09-23

## 2020-09-23 RX ORDER — ONDANSETRON 2 MG/ML
4 INJECTION INTRAMUSCULAR; INTRAVENOUS EVERY 30 MIN PRN
Status: DISCONTINUED | OUTPATIENT
Start: 2020-09-23 | End: 2020-09-23 | Stop reason: HOSPADM

## 2020-09-23 RX ORDER — CEFAZOLIN SODIUM 2 G/100ML
2 INJECTION, SOLUTION INTRAVENOUS
Status: COMPLETED | OUTPATIENT
Start: 2020-09-23 | End: 2020-09-23

## 2020-09-23 RX ORDER — FENTANYL CITRATE 50 UG/ML
INJECTION, SOLUTION INTRAMUSCULAR; INTRAVENOUS PRN
Status: DISCONTINUED | OUTPATIENT
Start: 2020-09-23 | End: 2020-09-23

## 2020-09-23 RX ORDER — ALBUTEROL SULFATE 90 UG/1
1-2 AEROSOL, METERED RESPIRATORY (INHALATION) DAILY PRN
Status: DISCONTINUED | OUTPATIENT
Start: 2020-09-23 | End: 2020-09-24 | Stop reason: HOSPADM

## 2020-09-23 RX ORDER — NALOXONE HYDROCHLORIDE 0.4 MG/ML
.1-.4 INJECTION, SOLUTION INTRAMUSCULAR; INTRAVENOUS; SUBCUTANEOUS
Status: ACTIVE | OUTPATIENT
Start: 2020-09-23 | End: 2020-09-24

## 2020-09-23 RX ORDER — FENTANYL CITRATE 50 UG/ML
25-50 INJECTION, SOLUTION INTRAMUSCULAR; INTRAVENOUS
Status: DISCONTINUED | OUTPATIENT
Start: 2020-09-23 | End: 2020-09-23 | Stop reason: HOSPADM

## 2020-09-23 RX ORDER — HYDROMORPHONE HYDROCHLORIDE 1 MG/ML
.3-.5 INJECTION, SOLUTION INTRAMUSCULAR; INTRAVENOUS; SUBCUTANEOUS
Status: DISCONTINUED | OUTPATIENT
Start: 2020-09-23 | End: 2020-09-24 | Stop reason: HOSPADM

## 2020-09-23 RX ORDER — IBUPROFEN 600 MG/1
600 TABLET, FILM COATED ORAL EVERY 6 HOURS
COMMUNITY
Start: 2020-09-23 | End: 2023-01-02

## 2020-09-23 RX ORDER — LABETALOL 20 MG/4 ML (5 MG/ML) INTRAVENOUS SYRINGE
10
Status: DISCONTINUED | OUTPATIENT
Start: 2020-09-23 | End: 2020-09-23 | Stop reason: HOSPADM

## 2020-09-23 RX ORDER — GABAPENTIN 300 MG/1
300 CAPSULE ORAL ONCE
Status: COMPLETED | OUTPATIENT
Start: 2020-09-23 | End: 2020-09-23

## 2020-09-23 RX ORDER — SODIUM CHLORIDE, SODIUM LACTATE, POTASSIUM CHLORIDE, CALCIUM CHLORIDE 600; 310; 30; 20 MG/100ML; MG/100ML; MG/100ML; MG/100ML
INJECTION, SOLUTION INTRAVENOUS CONTINUOUS PRN
Status: DISCONTINUED | OUTPATIENT
Start: 2020-09-23 | End: 2020-09-23

## 2020-09-23 RX ORDER — CEFAZOLIN SODIUM 1 G/3ML
1 INJECTION, POWDER, FOR SOLUTION INTRAMUSCULAR; INTRAVENOUS SEE ADMIN INSTRUCTIONS
Status: DISCONTINUED | OUTPATIENT
Start: 2020-09-23 | End: 2020-09-23 | Stop reason: HOSPADM

## 2020-09-23 RX ORDER — LIDOCAINE 40 MG/G
CREAM TOPICAL
Status: DISCONTINUED | OUTPATIENT
Start: 2020-09-23 | End: 2020-09-24 | Stop reason: HOSPADM

## 2020-09-23 RX ORDER — FENTANYL CITRATE-0.9 % NACL/PF 10 MCG/ML
PLASTIC BAG, INJECTION (ML) INTRAVENOUS CONTINUOUS PRN
Status: DISCONTINUED | OUTPATIENT
Start: 2020-09-23 | End: 2020-09-23

## 2020-09-23 RX ORDER — LIDOCAINE 40 MG/G
CREAM TOPICAL
Status: DISCONTINUED | OUTPATIENT
Start: 2020-09-23 | End: 2020-09-23 | Stop reason: HOSPADM

## 2020-09-23 RX ORDER — POLYETHYLENE GLYCOL 3350 17 G
2 POWDER IN PACKET (EA) ORAL
Status: DISCONTINUED | OUTPATIENT
Start: 2020-09-23 | End: 2020-09-24 | Stop reason: HOSPADM

## 2020-09-23 RX ORDER — FLUTICASONE PROPIONATE 50 MCG
1-2 SPRAY, SUSPENSION (ML) NASAL DAILY PRN
Status: DISCONTINUED | OUTPATIENT
Start: 2020-09-23 | End: 2020-09-24 | Stop reason: HOSPADM

## 2020-09-23 RX ORDER — OXYCODONE HYDROCHLORIDE 5 MG/1
5 TABLET ORAL EVERY 4 HOURS PRN
Status: DISCONTINUED | OUTPATIENT
Start: 2020-09-23 | End: 2020-09-23

## 2020-09-23 RX ORDER — BUPIVACAINE HYDROCHLORIDE 2.5 MG/ML
INJECTION, SOLUTION INFILTRATION; PERINEURAL PRN
Status: DISCONTINUED | OUTPATIENT
Start: 2020-09-23 | End: 2020-09-23 | Stop reason: HOSPADM

## 2020-09-23 RX ORDER — DEXAMETHASONE SODIUM PHOSPHATE 4 MG/ML
INJECTION, SOLUTION INTRA-ARTICULAR; INTRALESIONAL; INTRAMUSCULAR; INTRAVENOUS; SOFT TISSUE PRN
Status: DISCONTINUED | OUTPATIENT
Start: 2020-09-23 | End: 2020-09-23

## 2020-09-23 RX ORDER — ACETAMINOPHEN 325 MG/1
975 TABLET ORAL EVERY 6 HOURS
Status: DISCONTINUED | OUTPATIENT
Start: 2020-09-23 | End: 2020-09-24 | Stop reason: HOSPADM

## 2020-09-23 RX ORDER — CELECOXIB 200 MG/1
200 CAPSULE ORAL ONCE
Status: COMPLETED | OUTPATIENT
Start: 2020-09-23 | End: 2020-09-23

## 2020-09-23 RX ORDER — ACETAMINOPHEN 325 MG/1
975 TABLET ORAL EVERY 6 HOURS
COMMUNITY
Start: 2020-09-23 | End: 2020-10-20

## 2020-09-23 RX ORDER — LIDOCAINE HYDROCHLORIDE 20 MG/ML
INJECTION, SOLUTION INFILTRATION; PERINEURAL PRN
Status: DISCONTINUED | OUTPATIENT
Start: 2020-09-23 | End: 2020-09-23

## 2020-09-23 RX ORDER — GABAPENTIN 300 MG/1
600 CAPSULE ORAL ONCE
Status: COMPLETED | OUTPATIENT
Start: 2020-09-23 | End: 2020-09-23

## 2020-09-23 RX ORDER — PROPOFOL 10 MG/ML
INJECTION, EMULSION INTRAVENOUS PRN
Status: DISCONTINUED | OUTPATIENT
Start: 2020-09-23 | End: 2020-09-23

## 2020-09-23 RX ORDER — HYDROMORPHONE HYDROCHLORIDE 1 MG/ML
.3-.5 INJECTION, SOLUTION INTRAMUSCULAR; INTRAVENOUS; SUBCUTANEOUS EVERY 5 MIN PRN
Status: DISCONTINUED | OUTPATIENT
Start: 2020-09-23 | End: 2020-09-23 | Stop reason: HOSPADM

## 2020-09-23 RX ADMIN — FENTANYL CITRATE 100 MCG: 50 INJECTION, SOLUTION INTRAMUSCULAR; INTRAVENOUS at 11:13

## 2020-09-23 RX ADMIN — ROCURONIUM BROMIDE 100 MG: 10 INJECTION INTRAVENOUS at 11:14

## 2020-09-23 RX ADMIN — ACETAMINOPHEN 975 MG: 325 TABLET, FILM COATED ORAL at 16:51

## 2020-09-23 RX ADMIN — PROPOFOL 50 MG: 10 INJECTION, EMULSION INTRAVENOUS at 11:19

## 2020-09-23 RX ADMIN — GABAPENTIN 300 MG: 300 CAPSULE ORAL at 10:36

## 2020-09-23 RX ADMIN — HYDROMORPHONE HYDROCHLORIDE 0.5 MG: 1 INJECTION, SOLUTION INTRAMUSCULAR; INTRAVENOUS; SUBCUTANEOUS at 20:36

## 2020-09-23 RX ADMIN — IBUPROFEN 600 MG: 600 TABLET ORAL at 22:12

## 2020-09-23 RX ADMIN — PROPOFOL 200 MG: 10 INJECTION, EMULSION INTRAVENOUS at 11:13

## 2020-09-23 RX ADMIN — FENTANYL CITRATE 50 MCG: 50 INJECTION, SOLUTION INTRAMUSCULAR; INTRAVENOUS at 13:22

## 2020-09-23 RX ADMIN — DEXMEDETOMIDINE HYDROCHLORIDE 8 MCG: 100 INJECTION, SOLUTION INTRAVENOUS at 13:02

## 2020-09-23 RX ADMIN — FENTANYL CITRATE 50 MCG: 50 INJECTION, SOLUTION INTRAMUSCULAR; INTRAVENOUS at 13:19

## 2020-09-23 RX ADMIN — PHENYLEPHRINE HYDROCHLORIDE 100 MCG: 10 INJECTION INTRAVENOUS at 11:34

## 2020-09-23 RX ADMIN — OXYCODONE HYDROCHLORIDE 5 MG: 5 TABLET ORAL at 15:23

## 2020-09-23 RX ADMIN — FENTANYL CITRATE 50 MCG: 50 INJECTION, SOLUTION INTRAMUSCULAR; INTRAVENOUS at 13:44

## 2020-09-23 RX ADMIN — SODIUM CHLORIDE, POTASSIUM CHLORIDE, SODIUM LACTATE AND CALCIUM CHLORIDE: 600; 310; 30; 20 INJECTION, SOLUTION INTRAVENOUS at 11:08

## 2020-09-23 RX ADMIN — SUGAMMADEX 200 MG: 100 INJECTION, SOLUTION INTRAVENOUS at 13:07

## 2020-09-23 RX ADMIN — HYDROMORPHONE HYDROCHLORIDE 0.5 MG: 1 INJECTION, SOLUTION INTRAMUSCULAR; INTRAVENOUS; SUBCUTANEOUS at 13:45

## 2020-09-23 RX ADMIN — PHENYLEPHRINE HYDROCHLORIDE 200 MCG: 10 INJECTION INTRAVENOUS at 11:39

## 2020-09-23 RX ADMIN — PHENYLEPHRINE HYDROCHLORIDE 150 MCG: 10 INJECTION INTRAVENOUS at 11:29

## 2020-09-23 RX ADMIN — HYDROMORPHONE HYDROCHLORIDE 0.5 MG: 1 INJECTION, SOLUTION INTRAMUSCULAR; INTRAVENOUS; SUBCUTANEOUS at 14:07

## 2020-09-23 RX ADMIN — PHENYLEPHRINE HYDROCHLORIDE 100 MCG: 10 INJECTION INTRAVENOUS at 12:45

## 2020-09-23 RX ADMIN — ACETAMINOPHEN 975 MG: 325 TABLET, FILM COATED ORAL at 10:35

## 2020-09-23 RX ADMIN — HYDROMORPHONE HYDROCHLORIDE 0.5 MG: 1 INJECTION, SOLUTION INTRAMUSCULAR; INTRAVENOUS; SUBCUTANEOUS at 13:57

## 2020-09-23 RX ADMIN — ACETAMINOPHEN 975 MG: 325 TABLET, FILM COATED ORAL at 22:12

## 2020-09-23 RX ADMIN — ONDANSETRON 4 MG: 2 INJECTION INTRAMUSCULAR; INTRAVENOUS at 11:25

## 2020-09-23 RX ADMIN — LIDOCAINE HYDROCHLORIDE 100 MG: 20 INJECTION, SOLUTION INFILTRATION; PERINEURAL at 11:13

## 2020-09-23 RX ADMIN — OXYCODONE HYDROCHLORIDE 10 MG: 5 TABLET ORAL at 22:12

## 2020-09-23 RX ADMIN — DEXAMETHASONE SODIUM PHOSPHATE 10 MG: 4 INJECTION, SOLUTION INTRA-ARTICULAR; INTRALESIONAL; INTRAMUSCULAR; INTRAVENOUS; SOFT TISSUE at 11:25

## 2020-09-23 RX ADMIN — Medication 2 G: at 11:20

## 2020-09-23 RX ADMIN — Medication 10 MG: at 13:07

## 2020-09-23 RX ADMIN — HYDROMORPHONE HYDROCHLORIDE 0.5 MG: 1 INJECTION, SOLUTION INTRAMUSCULAR; INTRAVENOUS; SUBCUTANEOUS at 12:58

## 2020-09-23 RX ADMIN — CELECOXIB 200 MG: 200 CAPSULE ORAL at 10:35

## 2020-09-23 RX ADMIN — PHENYLEPHRINE HYDROCHLORIDE 0.3 MCG/KG/MIN: 10 INJECTION INTRAVENOUS at 11:44

## 2020-09-23 RX ADMIN — PHENYLEPHRINE HYDROCHLORIDE 100 MCG: 10 INJECTION INTRAVENOUS at 12:52

## 2020-09-23 RX ADMIN — HYDROMORPHONE HYDROCHLORIDE 0.5 MG: 1 INJECTION, SOLUTION INTRAMUSCULAR; INTRAVENOUS; SUBCUTANEOUS at 17:21

## 2020-09-23 RX ADMIN — GABAPENTIN 600 MG: 300 CAPSULE ORAL at 10:36

## 2020-09-23 RX ADMIN — NICOTINE POLACRILEX 2 MG: 2 LOZENGE ORAL at 21:31

## 2020-09-23 RX ADMIN — HYDROMORPHONE HYDROCHLORIDE 0.5 MG: 1 INJECTION, SOLUTION INTRAMUSCULAR; INTRAVENOUS; SUBCUTANEOUS at 14:05

## 2020-09-23 RX ADMIN — MIDAZOLAM 2 MG: 1 INJECTION INTRAMUSCULAR; INTRAVENOUS at 11:06

## 2020-09-23 RX ADMIN — SODIUM CHLORIDE, POTASSIUM CHLORIDE, SODIUM LACTATE AND CALCIUM CHLORIDE: 600; 310; 30; 20 INJECTION, SOLUTION INTRAVENOUS at 11:26

## 2020-09-23 RX ADMIN — ROCURONIUM BROMIDE 20 MG: 10 INJECTION INTRAVENOUS at 12:16

## 2020-09-23 RX ADMIN — FENTANYL CITRATE 50 MCG: 50 INJECTION, SOLUTION INTRAMUSCULAR; INTRAVENOUS at 13:50

## 2020-09-23 ASSESSMENT — ASTHMA QUESTIONNAIRES: ACT_TOTALSCORE: 25

## 2020-09-23 ASSESSMENT — LIFESTYLE VARIABLES: TOBACCO_USE: 1

## 2020-09-23 ASSESSMENT — ACTIVITIES OF DAILY LIVING (ADL)
ADLS_ACUITY_SCORE: 10
ADLS_ACUITY_SCORE: 10

## 2020-09-23 ASSESSMENT — MIFFLIN-ST. JEOR: SCORE: 2013.13

## 2020-09-23 NOTE — ANESTHESIA CARE TRANSFER NOTE
Patient: David Crane    Procedure(s):  Robot-assisted right thoracoscopic Wedge resection    Diagnosis: Pleural mass [J94.8]  Diagnosis Additional Information: No value filed.    Anesthesia Type:   General     Note:  Airway :Face Mask  Patient transferred to:PACU  Handoff Report: Identifed the Patient, Identified the Reponsible Provider, Reviewed the pertinent medical history, Discussed the surgical course, Reviewed Intra-OP anesthesia mangement and issues during anesthesia, Set expectations for post-procedure period and Allowed opportunity for questions and acknowledgement of understanding      Vitals: (Last set prior to Anesthesia Care Transfer)    CRNA VITALS  9/23/2020 1249 - 9/23/2020 1344      9/23/2020             NIBP:  117/86    Ht Rate:  82    SpO2:  100 %                Electronically Signed By: DEREK Anne CRNA  September 23, 2020  1:44 PM

## 2020-09-23 NOTE — PROGRESS NOTES
"  Thoracic Surgery Progress Note  Surgery Cross-Cover  Post Op Check    09/23/2020    David Crane is a 40 year old male with h/o pleural mass now POD#0 s/p robotic r thoracoscopic wedge resection    Pt reports significant pain with movement and inspiration. Tolerating CLD. Cites some SOB due to pain.Denies nausea or dizziness. BM x1. Has not yet voided.    /52   Pulse 81   Temp 98.4  F (36.9  C) (Oral)   Resp 17   Ht 1.803 m (5' 11\")   Wt 107.1 kg (236 lb 1.8 oz)   SpO2 94%   BMI 32.93 kg/m      Gen: A&O x3, NAD, uncomfortable  Chest: breathing non-labored on 2L NC  CT serosang output, to WS  Abdomen: soft, non-tender, non-distended  Incision: clean, dry, intact  Extremities: warm and well perfused    I/O last 3 completed shifts:  In: 400 [I.V.:400]  Out: 10 [Chest Tube:10]    XR imaging review    A/P: No acute post-op issues. Requires additional pain control, will increase parameters.    Continue plan of care per primary team. Please call with any questions.    Jayjay Bentley MD EFREN  PGY-1 Surgery  Pager [phone]    "

## 2020-09-23 NOTE — OP NOTE
Preoperative diagnosis:                         Lung nodule  Postoperative diagnosis:                       Lung nodule    Procedure:   1. Robotic-assisted right thoracoscopic wedge    Anesthesia: General   Surgeon: Cedrick Francis   Resident surgeon: Timmy Aguilar MD; Ortiz Lincoln PA-C  EBL: Minimal    Complications: none immediate     Description of procedure  The patient was brought to the room and placed supine upon the table.  After confirming the patient's identity and verifying the consent, appropriate monitoring devices were placed, as well as SCD boots. General anesthesia was administered.  The patient was intubated with a double-lumen endotracheal tube.  Proper position was confirmed by fiberoptic bronchoscopy.  Intravenous antibiotic was administered within 1 hour prior to the incision. The patient was turned to the left lateral decubitus position and all pressure points were padded. The bed was flexed, and the patient was secured to the table and the right arm was placed on an airplane board.  The right chest was prepped with chlorhexidine and  draped in the standard surgical fashion.    Three 8 mm incisions and one 12 mm incision were made and the robot was docked without difficulty.  The nodule was located coming off the right lower lobe, with a narrow stalk compared to the size of the nodule.  A wedge resection was performed with serial fires of the powered stapler with blue loads.   Hemostasis was noted.  The robot was undocked.  A multiple level intercostal nerve block was performed.  A 28 Czech chest tube was then placed going posteriorly to the apex  through the initial camera port site.  This was secured to the skin using 0 silk suture.  The lung was observed to inflate appropriately.  The utility incision was closed in layers, irrigating each layer prior to closure.  The areas were cleaned and dried and exofin was applied.     At  the end of the case, sponge, needle, and instrument counts were  correct.

## 2020-09-23 NOTE — OR NURSING
Pt arrived with significant pain from Rt plural area..VSS Responded quite well to IV narcotic's Rt chest tube to H2O seal .. no air leak noted.. Chest x-ray obtained..  Report to JONATHAN Sawyer RN

## 2020-09-23 NOTE — PLAN OF CARE
Admitted/transferred from: PACU  2 RN full   skin assessment completed by Will Zaldivar, SEKOU and Kim Muller  Skin assessment finding: issues found three lap site derma    Interventions/actions:  no Issue     Will continue to monitor.

## 2020-09-23 NOTE — BRIEF OP NOTE
[facility], [place]    Brief Operative Note    Pre-operative diagnosis: Pleural mass [J94.8]    Post-operative diagnosis Same as pre-operative diagnosis    Procedure:  Robot-Assisted Right Thoracoscopic Wedge Resection    Surgeon: Surgeon(s) and Role:     * Cedrick Francis MD - Primary     * Rubén Lincoln PA-C - Assisting     * Timmy Aguilar MD - Fellow    Anesthesia: General     Estimated blood loss: Less than 10 ml    Drains: 28 Fr Apical Chest Tube to Water Seal    Specimens:   ID Type Source Tests Collected by Time Destination   A : Right Lower Lobe Wedge Tissue Lung, Right Lower Lobe SURGICAL PATHOLOGY EXAM Cedrick Francis MD 9/23/2020 12:46 PM      Findings: Lung mass on visceral pleural. No parietal pleural involvement.     Complications: None    Implants: None    Timmy Aguilar MD  Cardiothoracic Surgery Fellow  [phone]

## 2020-09-23 NOTE — ANESTHESIA POSTPROCEDURE EVALUATION
Anesthesia POST Procedure Evaluation    Patient: David Crane   MRN:     [id number] Gender:   male   Age:    40 year old :      1980        Preoperative Diagnosis: Pleural mass [J94.8]   Procedure(s):  Robot-assisted right thoracoscopic Wedge resection   Postop Comments: No value filed.     Anesthesia Type: General       Disposition: Admission   Postop Pain Control: Uneventful            Sign Out: Well controlled pain   PONV: No   Neuro/Psych: Uneventful            Sign Out: Acceptable/Baseline neuro status   Airway/Respiratory: Uneventful            Sign Out: Acceptable/Baseline resp. status   CV/Hemodynamics: Uneventful            Sign Out: Acceptable CV status   Other NRE: NONE   DID A NON-ROUTINE EVENT OCCUR? No         Last Anesthesia Record Vitals:  CRNA VITALS  2020 1249 - 2020 1349      2020             NIBP:  117/86    Ht Rate:  82    SpO2:  100 %          Last PACU Vitals:  Vitals Value Taken Time   /66 2020  2:30 PM   Temp 36.5  C (97.7  F) 2020  2:20 PM   Pulse 85 2020  2:38 PM   Resp 14 2020  2:20 PM   SpO2 96 % 2020  2:45 PM   Temp src     NIBP 117/86 2020  1:22 PM   Pulse     SpO2 100 % 2020  1:22 PM   Resp     Temp     Ht Rate 82 2020  1:22 PM   Temp 2     Vitals shown include unvalidated device data.      Electronically Signed By: Lupillo Burns MD, 2020, 3:07 PM

## 2020-09-23 NOTE — ANESTHESIA PREPROCEDURE EVALUATION
"Anesthesia Pre-Procedure Evaluation    Patient: David Crane   MRN:     [id number] Gender:   male   Age:    40 year old :      1980        Preoperative Diagnosis: Pleural mass [J94.8]   Procedure(s):  Robot-assisted right thoracoscopic resection of pleural mass     LABS:  CBC:   Lab Results   Component Value Date    WBC 7.4 2019    WBC 8.6 2019    HGB 14.7 2019    HGB 14.9 2019    HCT 40.6 2019    HCT 41.8 2019     2019     2019     BMP:   Lab Results   Component Value Date     2019     2019    POTASSIUM 3.3 (L) 2019    POTASSIUM 3.5 2019    CHLORIDE 107 2019    CHLORIDE 109 2019    CO2 25 2019    CO2 27 2019    BUN 15 2019    BUN 13 2019    CR 0.88 2019    CR 0.91 2019     (H) 2019    GLC 95 2019     COAGS: No results found for: PTT, INR, FIBR  POC: No results found for: BGM, HCG, HCGS  OTHER:   Lab Results   Component Value Date    LACT 1.4 2018    HILARIO 8.7 2019    ALBUMIN 4.0 2019    PROTTOTAL 7.5 2019    ALT 56 2019    AST 20 2019    ALKPHOS 99 2019    BILITOTAL 0.5 2019    LIPASE 274 2019        Preop Vitals    BP Readings from Last 3 Encounters:   20 138/86   20 133/85   06/10/19 142/86    Pulse Readings from Last 3 Encounters:   20 80   20 84   06/10/19 86      Resp Readings from Last 3 Encounters:   20 16   20 16   06/10/19 16    SpO2 Readings from Last 3 Encounters:   20 97%   20 95%   06/10/19 94%      Temp Readings from Last 1 Encounters:   20 36.7  C (98.1  F) (Oral)    Ht Readings from Last 1 Encounters:   20 1.803 m (5' 11\")      Wt Readings from Last 1 Encounters:   20 108 kg (238 lb)    Estimated body mass index is 33.19 kg/m  as calculated from the following:    Height as of 20: 1.803 m (5' 11\").    Weight " as of 9/22/20: 108 kg (238 lb).     LDA:  Peripheral IV 09/03/18 Left (Active)   Number of days: 751       Peripheral IV 04/25/19 Right (Active)   Number of days: 517        Past Medical History:   Diagnosis Date     Other, mixed, or unspecified nondependent drug abuse, in remission     History of IV drug use, methamphetamine and other drugs.       Septic arthritis of hip (H) 2002      Past Surgical History:   Procedure Laterality Date     HC STRABISMUS SURG,TWO HORIZ MUSCLE  01/09/06    LT     LASER SURGERY OF EYE        Allergies   Allergen Reactions     No Known Drug Allergies         Anesthesia Evaluation     .             ROS/MED HX    ENT/Pulmonary: Comment: R pleural mass     (+)tobacco use, Past use , . .    Neurologic:  - neg neurologic ROS     Cardiovascular:  - neg cardiovascular ROS       METS/Exercise Tolerance:     Hematologic:  - neg hematologic  ROS       Musculoskeletal:  - neg musculoskeletal ROS       GI/Hepatic:  - neg GI/hepatic ROS       Renal/Genitourinary:  - ROS Renal section negative       Endo:  - neg endo ROS       Psychiatric:         Infectious Disease:  - neg infectious disease ROS       Malignancy:         Other:                         PHYSICAL EXAM:   Mental Status/Neuro: A/A/O   Airway: Facies: Feasible  Mallampati: II  Mouth/Opening: Full  TM distance: > 6 cm  Neck ROM: Full   Respiratory: Auscultation: CTAB     Resp. Rate: Normal     Resp. Effort: Normal      CV: Rhythm: Regular  Heart: Normal Sounds   Comments:      Dental: Normal Dentition                Assessment:   ASA SCORE: 3    H&P: History and physical reviewed and following examination; no interval change.   Smoking Status:  Non-Smoker/Unknown   NPO Status: NPO Appropriate     Plan:   Anes. Type:  General   Pre-Medication: None   Induction:  IV (Standard)   Airway: ETT; Oral   Access/Monitoring: PIV; 2nd PIV   Maintenance: Balanced     Postop Plan:   Postop Pain: Opioids  Postop Sedation/Airway: Not  planned  Disposition: Inpatient/Admit     PONV Management:   Adult Risk Factors:, Non-Smoker, Postop Opioids   Prevention: Ondansetron, Dexamethasone     CONSENT: Direct conversation   Plan and risks discussed with: Patient   Blood Products: Consent Deferred (Minimal Blood Loss)                   Lupillo Burns MD

## 2020-09-23 NOTE — PLAN OF CARE
Vitals:    09/23/20 1420 09/23/20 1445 09/23/20 1450 09/23/20 1505   BP: 107/66  112/65 (!) 141/58   BP Location:       Pulse: 71  72 84   Resp: 14  14 14   Temp: 97.7  F (36.5  C)  98.4  F (36.9  C)    TempSrc: Oral  Oral    SpO2: 96% 96% 93% 94%   Weight:       Height:        40 years old male came from PACU after having right wedge resection, three lap site derma bond intact, chest tube to water seal with scant out put, alert and oriented on arrival, complain of pain, Oxy and dilaudid given with relief, tolerating clear, plan to get up and ambulate, currently resting, continue with plan of care.

## 2020-09-24 ENCOUNTER — APPOINTMENT (OUTPATIENT)
Dept: GENERAL RADIOLOGY | Facility: CLINIC | Age: 40
DRG: 168 | End: 2020-09-24
Attending: THORACIC SURGERY (CARDIOTHORACIC VASCULAR SURGERY)
Payer: COMMERCIAL

## 2020-09-24 ENCOUNTER — APPOINTMENT (OUTPATIENT)
Dept: GENERAL RADIOLOGY | Facility: CLINIC | Age: 40
DRG: 168 | End: 2020-09-24
Attending: NURSE PRACTITIONER
Payer: COMMERCIAL

## 2020-09-24 ENCOUNTER — PATIENT OUTREACH (OUTPATIENT)
Dept: CARE COORDINATION | Facility: CLINIC | Age: 40
End: 2020-09-24

## 2020-09-24 VITALS
HEIGHT: 71 IN | TEMPERATURE: 97.8 F | WEIGHT: 236.11 LBS | HEART RATE: 77 BPM | DIASTOLIC BLOOD PRESSURE: 75 MMHG | RESPIRATION RATE: 16 BRPM | SYSTOLIC BLOOD PRESSURE: 136 MMHG | BODY MASS INDEX: 33.06 KG/M2 | OXYGEN SATURATION: 95 %

## 2020-09-24 LAB — RADIOLOGIST FLAGS: ABNORMAL

## 2020-09-24 PROCEDURE — 25000132 ZZH RX MED GY IP 250 OP 250 PS 637: Performed by: STUDENT IN AN ORGANIZED HEALTH CARE EDUCATION/TRAINING PROGRAM

## 2020-09-24 PROCEDURE — 71045 X-RAY EXAM CHEST 1 VIEW: CPT | Mod: 77

## 2020-09-24 PROCEDURE — 25000128 H RX IP 250 OP 636: Performed by: STUDENT IN AN ORGANIZED HEALTH CARE EDUCATION/TRAINING PROGRAM

## 2020-09-24 PROCEDURE — 25000132 ZZH RX MED GY IP 250 OP 250 PS 637

## 2020-09-24 PROCEDURE — 25000128 H RX IP 250 OP 636

## 2020-09-24 PROCEDURE — 71045 X-RAY EXAM CHEST 1 VIEW: CPT

## 2020-09-24 RX ORDER — POLYETHYLENE GLYCOL 3350 17 G
2 POWDER IN PACKET (EA) ORAL
Qty: 25 LOZENGE | Refills: 0 | Status: SHIPPED | OUTPATIENT
Start: 2020-09-24 | End: 2020-10-20

## 2020-09-24 RX ORDER — OXYCODONE HYDROCHLORIDE 5 MG/1
5-10 TABLET ORAL EVERY 4 HOURS PRN
Qty: 15 TABLET | Refills: 0 | Status: SHIPPED | OUTPATIENT
Start: 2020-09-24 | End: 2020-09-28

## 2020-09-24 RX ADMIN — ACETAMINOPHEN 975 MG: 325 TABLET, FILM COATED ORAL at 12:06

## 2020-09-24 RX ADMIN — IBUPROFEN 600 MG: 600 TABLET ORAL at 05:13

## 2020-09-24 RX ADMIN — ACETAMINOPHEN 975 MG: 325 TABLET, FILM COATED ORAL at 05:13

## 2020-09-24 RX ADMIN — ENOXAPARIN SODIUM 40 MG: 40 INJECTION SUBCUTANEOUS at 08:05

## 2020-09-24 RX ADMIN — HYDROMORPHONE HYDROCHLORIDE 0.5 MG: 1 INJECTION, SOLUTION INTRAMUSCULAR; INTRAVENOUS; SUBCUTANEOUS at 12:50

## 2020-09-24 RX ADMIN — IBUPROFEN 600 MG: 600 TABLET ORAL at 09:48

## 2020-09-24 RX ADMIN — OXYCODONE HYDROCHLORIDE 10 MG: 5 TABLET ORAL at 09:48

## 2020-09-24 ASSESSMENT — ACTIVITIES OF DAILY LIVING (ADL)
DRESS: 0-->INDEPENDENT
ADLS_ACUITY_SCORE: 10
SWALLOWING: 0-->SWALLOWS FOODS/LIQUIDS WITHOUT DIFFICULTY
ADLS_ACUITY_SCORE: 10
RETIRED_COMMUNICATION: 0-->UNDERSTANDS/COMMUNICATES WITHOUT DIFFICULTY
TOILETING: 0-->INDEPENDENT
FALL_HISTORY_WITHIN_LAST_SIX_MONTHS: NO
COGNITION: 0 - NO COGNITION ISSUES REPORTED
ADLS_ACUITY_SCORE: 10
RETIRED_EATING: 0-->INDEPENDENT
BATHING: 0-->INDEPENDENT
ADLS_ACUITY_SCORE: 10
AMBULATION: 0-->INDEPENDENT
TRANSFERRING: 0-->INDEPENDENT

## 2020-09-24 NOTE — DISCHARGE SUMMARY
[facility] Discharge Summary    David Crane MRN: [id number]   YOB: 1980 Age: 40 year old     Date of Admission:  9/23/2020  Date of Discharge::  9/24/2020  Admitting Physician:  Cedrick Francis MD  Discharge Physician:  Kelly Brice MD  Primary Care Physician:         Sonia Calhoun Ban          Discharge Diagnosis:   Pleural mass         Procedures:   Robotic right thorascopic wedge resection          Consultations:   None          HPI (from H&P):   40 year old man with a pleural mass that is growing, which based on its appearance, it could be a solitary fibrous tumor of the pleura, or another benign lesion like a schwannoma, with a malignancy less likely, who would benefit from surgical intervention via a wedge resection.           Hospital Course:   The patient underwent listed procedure(s) above, which he tolerated without complications. Overall unremarkable hospitalization.    At the time of discharge, he was tolerating PO intake, ambulating, voiding spontaneously without difficulty, and pain was controlled with oral pain medications. The patient was discharged home in stable and improved condition.         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  He will undergo annual CT scan surveillance.         Pertinent Imaging Results:   CXR 9/24/20: 1. Stable small right apical pneumothorax. 2. Stable mild pulmonary vascular congestion. No new focal airspace opacity.          Medications Prior to Admission:     Medications Prior to Admission   Medication Sig Dispense Refill Last Dose     fluticasone (FLONASE) 50 MCG/ACT nasal spray Spray 1-2 sprays into both nostrils daily 16 g 3 Past Week at Unknown time     MULTIVITAMIN/MINERAL FORMULA OR TABS 1 TABLET DAILY   Past Week at Unknown time     albuterol (PROAIR HFA/PROVENTIL HFA/VENTOLIN HFA) 108 (90 Base) MCG/ACT inhaler Inhale 1-2 puffs into the lungs daily as  needed for shortness of breath / dyspnea or wheezing 1 Inhaler 6 More than a month at Unknown time            Discharge Medications:     Current Discharge Medication List      START taking these medications    Details   acetaminophen (TYLENOL) 325 MG tablet Take 3 tablets (975 mg) by mouth every 6 hours  Qty:      Associated Diagnoses: Pleural mass      ibuprofen (ADVIL/MOTRIN) 600 MG tablet Take 1 tablet (600 mg) by mouth every 6 hours  Qty:      Associated Diagnoses: Pleural mass         CONTINUE these medications which have NOT CHANGED    Details   fluticasone (FLONASE) 50 MCG/ACT nasal spray Spray 1-2 sprays into both nostrils daily  Qty: 16 g, Refills: 3    Associated Diagnoses: Cough      MULTIVITAMIN/MINERAL FORMULA OR TABS 1 TABLET DAILY      albuterol (PROAIR HFA/PROVENTIL HFA/VENTOLIN HFA) 108 (90 Base) MCG/ACT inhaler Inhale 1-2 puffs into the lungs daily as needed for shortness of breath / dyspnea or wheezing  Qty: 1 Inhaler, Refills: 6    Associated Diagnoses: Exercise-induced asthma                  Day of Discharge Physical Exam:   Temp:  [96.2  F (35.7  C)-98.5  F (36.9  C)] 97.8  F (36.6  C)  Pulse:  [65-92] 77  Resp:  [11-20] 16  BP: (107-141)/(52-99) 136/75  SpO2:  [92 %-97 %] 95 %  General: A&O, NAD, lying comfortably in bed  Chest: NLB on RA  Abd: Soft, ND, NT  Extremities: WWP  Neuro: Moving all extremities spontaneously without apparent deficit         Discharge Instructions and Follow-Up:        Reason for your hospital stay    VATS wedge resection for pleural mass     Adult [facility] Follow-up and recommended labs and tests    1.) Follow up with primary care physician, in 1-2 weeks.   2.) Follow up with a thoracic surgery Clinical Nurse Specialist in Thoracic Surgery clinic in 1 month, prior to which a CXR should be performed.      Appointments on [place] and/or [facility] (with [facility] or [facility] provider or service). Call [phone] if you haven't heard regarding these appointments  "within 7 days of discharge.     Activity    Your activity upon discharge: activity as tolerated     Discharge Instructions    THORACIC SURGERY DISCHARGE INSTRUCTIONS    DIET: Regular    If your plans upon discharge include prolonged periods of sitting (i.e a lengthy car or plane ride), it is highly beneficial to get up and walk at least once per hour to help prevent swelling and blood clots.     You may remove chest tube dressing 48 hours after tube removal and bandage the site at your own discretion thereafter.  Small amounts of leakage are normal for 2-3 days after removal.  Feel free to call with questions.    You may get incision wet 2 days after operation. Do not submerge, soak, or scrub incision or swim until seen in follow-up.    Take incentive spirometer home for continued frequent use    Activity as tolerated, no strenous activity until seen in follow-up, no lifting greater than 20 pounds for the next 4 weeks.    Stay hydrated. Take over the counter fiber (metamucil or benefiber) and stool softeners (Miralax, docusate or senna) if becoming constipated.     Call for fever greater than 101.5, chills, increased size of incision, red skin around incision, vision changes, muscle strength changes, sensation changes, shortness of breath, or other concerns.    No driving while taking narcotic pain medication.    Transition to ibuprofen or tylenol/acetaminophen for pain control. Do not take tylenol/acetaminophen and acetaminophen containing narcotic (e.g., percocet or vicodin) at the same time. If you have known ulcer problems, or kidney trouble (elevated creatinine) do not take the ibuprofen.    In emergencies, call 911    For other Questions or Concerns;   A.) During weekday working hours (Monday through Friday 8am to 4:30pm)   call [phone] and ask to speak to a clinical nurse specialist.     B.) At nights (after 4:30pm), on weekends, or if urgent call [phone] and   tell the  \"I would like " "to page job code [phone], the thoracic surgery   fellow on call, please.\"     Diet    Follow this diet upon discharge: Regular diet       Condition at discharge: Stable    - - - - - - - - - - - - - - - - - -  Jayjay Bentley MD EFREN  General Surgery PGY-1  [facility]      "

## 2020-09-24 NOTE — PLAN OF CARE
Vitals:    09/24/20 0300 09/24/20 0511 09/24/20 0600 09/24/20 1118   BP:  128/67 129/72 136/75   BP Location:  Right arm  Right arm   Pulse:  76 65 77   Resp:  16 16 16   Temp:   96.2  F (35.7  C) 97.8  F (36.6  C)   TempSrc:    Oral   SpO2: 95% 93% 96% 95%   Weight:       Height:        Discharge script written, teaching has been done, script sent to discharge pharmacy,  Pt discharged home.

## 2020-09-24 NOTE — PROGRESS NOTES
"  THORACIC SURGERY PROGRESS NOTE  September 24, 2020     S. No acute events overnight. Chest tube fell out, with occlusive dressing placed w/ minor desaturation. Satting well on 3L NC. Urinating.     O.  /67 (BP Location: Right arm)   Pulse 76   Temp 97.2  F (36.2  C) (Oral)   Resp 16   Ht 1.803 m (5' 11\")   Wt 107.1 kg (236 lb 1.8 oz)   SpO2 93%   BMI 32.93 kg/m       Gen: A&O x3, NAD, uncomfortable  Chest: breathing non-labored on 3L NC  Abdomen: soft, non-tender, non-distended  Incision: clean, dry, intact  Extremities: warm and well perfused    I/O last 3 completed shifts:  In: 400 [I.V.:400]  Out: 20 [Chest Tube:20]    Labs reviewed  Imaging reviewed - CXR 9/24: Small to moderate right apical pneumothorax    A/P: 40YOM with h/o pleural mass now POD#0 s/p robotic r thoracoscopic wedge resection, progressing with post-operative goals    - Pt reports improvement in his pain with removal of his IV and CT  - Reg diet  - IS, ambulate as able, OOB  - Pain control  - Bowel regiment  - Repeat CXR read pending  - Dispo: Home today if able to wean air     Seen and d/w staff     Jayjay Bentley MD EFREN  PGY-1  Surgery  [facility]  Pager [phone]           "

## 2020-09-24 NOTE — PROGRESS NOTES
Rapid Response Team Note    Assessment   In assessment a rapid response was called on David Crane due to accidental loss of chest tube.      The patient is NOT critically ill at this time.    Sepsis Evaluation   NO EVIDENCE OF SEPSIS at this time.  Vital sign, physical exam, and lab findings are likely due to loss of chest tube.    Plan   1) cover site  2) stat chest x-ray  3) Final plan for chest tube per thoracic team      Disposition: The patient will remain on the current unit. We will continue to monitor this patient closely..    The Thoracic Surgery primary team was able to be reached and they are in agreement with the above plan.    Reassessment   Reassessment and plan follow-up will be performed by the primary team. The current agreed upon plan for reassessment/follow-up includes the following imaging: chest x-ray and sats and will be completed in 30 minutes.    Time Spent on this Encounter   Total Critical Care time spent by me, excluding procedures, was 15 minutes.    DEREK Carbone CNP  [facility] RRT AMCOM Job Code Contact #[phone]    Hospital Course   Admission Diagnosis: Pleural mass [J94.8]     Brief Summary of events leading to rapid response:   A rapid response was called for David Crane due to accidental loss of chest tube.  Pt had wedge resection, woke up during sleep and accidentally dislodged chest tube.    The patients is not known to have an infection.    Significant Comorbidities:   Recent thoracic surgery for mass, now 1 days post operative    Medications   Scheduled     acetaminophen  975 mg Oral Q6H     enoxaparin ANTICOAGULANT  40 mg Subcutaneous Q24H     ibuprofen  600 mg Oral Q6H     sodium chloride (PF)  3 mL Intracatheter Q8H      PRN   albuterol, fluticasone, HYDROmorphone, lidocaine 4%, lidocaine (buffered or not buffered), naloxone, nicotine, oxyCODONE, sodium chloride (PF)   Allergies   Allergies   Allergen Reactions     No Known Drug Allergies         Physical  Exam   Temp: 97.2  F (36.2  C) Temp  Min: 96.4  F (35.8  C)  Max: 98.5  F (36.9  C)  Resp: 20 Resp  Min: 11  Max: 20  SpO2: 94 % SpO2  Min: 92 %  Max: 98 %  Pulse: 75 Pulse  Min: 67  Max: 92    No data recorded  BP: 132/78 Systolic (24hrs), Av , Min:107 , Max:144   Diastolic (24hrs), Av, Min:52, Max:99     I/Os: I/O last 3 completed shifts:  In: 400 [I.V.:400]  Out: 20 [Chest Tube:20]     Exam:   General: in no acute distress  Mental Status: AAOx4.      Significant Results and Procedures   Lactic Acid:   Recent Labs   Lab Test 18  1908   LACT 1.4     CBC:   Recent Labs   Lab Test 20  1036 19  0028 19  1014 18  1134   WBC  --  7.4 8.6 11.0   HGB 15.7 14.7 14.9 15.5   HCT  --  40.6 41.8 44.1

## 2020-09-24 NOTE — PROVIDER NOTIFICATION
09/24/20 0200   Call Information   Date of Call 09/24/20   Time of Call 0235   Name of person requesting the team Susan   Title of person requesting team RN   RRT Arrival time 0236   Time RRT ended 0300   Reason for call   Type of RRT Adult   Primary reason for call Additional help needed   Was patient transferred from the ED, ICU, or PACU within last 24 hours prior to RRT call? Yes   SBAR   Situation Chest tube accidentally pulled out   Background Pleural mass   Notable History/Conditions   (Pleural mass)   Assessment AOX4, AVSS, c/o pain across the chest and increasing shortness of breath   Interventions CXR;Other (describe)   Adjustments to Recommend ?need to replace CT   Patient Outcome   Patient Outcome Stabilized on unit   RRT Team   Attending/Primary/Covering Physician Leia Pop MD   Physician(s) Octavia Higgins NP   Lead RN Kinjal Cotto   Post RRT Intervention Assessment   Post RRT Assessment Stable/Improved   Date Follow Up Done 09/24/20   Time Follow Up Done 0505      Pt sats 94-95%. States he feels better, denies SOB now.  Plan is for MDs to follow up in the am whether to place another chest tube or not.

## 2020-09-24 NOTE — PROGRESS NOTES
Brief cross cover note    I was paged that the patient's chest tube came out. Upon ambulating to the restroom, patient became increasingly SOB until NC put back on.     He has some SOB and pleuritic chest pain.     Vitals:    09/23/20 1855 09/23/20 2213 09/24/20 0239 09/24/20 0300   BP: 123/67 132/74 132/78    BP Location:   Right arm    Pulse: 72 92 75    Resp: 15 18 20    Temp:  96.4  F (35.8  C) 97.2  F (36.2  C)    TempSrc:  Oral Oral    SpO2: 93% 94% 94% 95%   Weight:       Height:         Sitting upright on the side of the bed.   Shallow resp  Chest tube site clean and dry    Stat CXR shows small to moderate pneumo    - Cover with occlusive dressing  - Repeat CXR in AM    Patient discussed with fellow Dr. Jeff Pop MD (PGY-1)  Surgery

## 2020-09-24 NOTE — PLAN OF CARE
"/74   Pulse 92   Temp 96.4  F (35.8  C) (Oral)   Resp 18   Ht 1.803 m (5' 11\")   Wt 107.1 kg (236 lb 1.8 oz)   SpO2 94%   BMI 32.93 kg/m       Neuros: A&Ox4. Able to make needs known.   Activity: AX1.   Cardiac:  WNL. Denies cardiac chest pain   Respiratory: R LS diminished. Sating well on 2L NC.  Frequent cough. R CT to WS.   Diet: Clears   GI/Gu:  Voiding without difficulty.No BM this shift. Not yet passing gas. Denies N/V  Skin/Incisions: 3 Lap sites dermabonded- RICKY.   LDA: PIV SL  Pain: chest pressure   PRN: No PRNs this shift   Changes:  Patient pulled out R chest tube at 0240, occlusive dressing placed,RRT called, chest x-ray ordered. Chest x-ray shows small pneumo, providers did not want CT replaced at this time, repeat chest x-ray in AM. Patient reports increased SOB and chest pressure, O2 increased to 3 LPM NC.   Plan: Continue POC     JACOB RUIZ, RN on 9/24/2020 at 1:11 AM       "

## 2020-09-25 ENCOUNTER — PATIENT OUTREACH (OUTPATIENT)
Dept: ONCOLOGY | Facility: CLINIC | Age: 40
End: 2020-09-25

## 2020-09-25 DIAGNOSIS — J94.8 MASS OF PLEURA: Primary | ICD-10-CM

## 2020-09-25 NOTE — PROGRESS NOTES
[facility]: Post-Discharge Note  SITUATION                                                      Admission:    Admission Date: 09/23/20   Reason for Admission: Pleural mass  Discharge:   Discharge Date: 09/24/20  Discharge Diagnosis: Pleural mass    BACKGROUND                                                      40 year old man with a pleural mass that is growing, which based on its appearance, it could be a solitary fibrous tumor of the pleura, or another benign lesion like a schwannoma, with a malignancy less likely, who would benefit from surgical intervention via a wedge resection.    ASSESSMENT      Discharge Assessment  Patient reports symptoms are: Improved  Does the patient have all of their medications?: Yes  Does patient know what their new medications are for?: Yes  Does patient have a follow-up appointment scheduled?: Yes  Does patient have any other questions or concerns?: No    Post-op  Did the patient have surgery or a procedure: Yes  Incision: healing  Drainage: No  Bleeding: none  Fever: No  Chills: No  Redness: No  Warmth: No  Swelling: No  Incision site pain: No  Eating & Drinking: eating and drinking without complaints/concerns  PO Intake: regular diet  Bowel Function: normal  Urinary Status: voiding without complaint/concerns        PLAN                                                      Outpatient Plan:     1.) Follow up with primary care physician, in 1-2 weeks.   2.) Follow up with a thoracic surgery Clinical Nurse Specialist in Thoracic Surgery clinic in 1 month, prior to which a CXR should be performed.         Future Appointments   Date Time Provider Department Center   10/19/2020 11:30 AM Cedrick Francis MD [facility]           Amanda Tariq CMA

## 2020-09-25 NOTE — PROGRESS NOTES
"David called writer about disability paperwork. He reports the paperwork is being returned to his employer as it states there is \"no Dr. Philip.\"     Writer gave David our clinic fax number of [phone] to have the disability paperwork sent here.     Writer asked how pt is doing post-op. David reports he is still taking Percocet for pain but it is controlling his pain well. He denies drainage, redness at incision, and denies fever.     He reports a mild cough but no other symptoms.     Per chart review he is scheduled for his post-op follow-up with Dr. Francis on 10/19/20 and will need his CXR prior to this appointment. Writer asked if I can assist him in making this appt. David reported he would rather do a walk-in CXR at [facility] in [place] because he isn't sure of his child's schedule.     David is aware he needs to get CXR done before the video visit with Dr. Francis 10/19. He was given the after hours number and clinic number if he has any questions or concerns. David verbalized understanding and is in agreement with the plan.     Mee Delaney, RN, BSN, PRATIK  RN Care Coordinator  [facility]  [phone]      "

## 2020-09-28 ENCOUNTER — MYC MEDICAL ADVICE (OUTPATIENT)
Dept: ONCOLOGY | Facility: CLINIC | Age: 40
End: 2020-09-28

## 2020-09-28 ENCOUNTER — PATIENT OUTREACH (OUTPATIENT)
Dept: ONCOLOGY | Facility: CLINIC | Age: 40
End: 2020-09-28

## 2020-09-28 DIAGNOSIS — J94.8 PLEURAL MASS: ICD-10-CM

## 2020-09-28 RX ORDER — OXYCODONE HYDROCHLORIDE 5 MG/1
5-10 TABLET ORAL EVERY 4 HOURS PRN
Qty: 30 TABLET | Refills: 0 | Status: SHIPPED | OUTPATIENT
Start: 2020-09-28 | End: 2022-07-24

## 2020-09-28 NOTE — PROGRESS NOTES
Writer faxed completed disability paperwork to [employer].    Fax transmission confirmed via Right Fax.     Mee Delaney, RN, BSN, PRATIK  RN Care Coordinator  [facility]  [phone]

## 2020-09-28 NOTE — PROGRESS NOTES
Pending Prescriptions:                       Disp   Refills    oxyCODONE (ROXICODONE) 5 MG tablet        15 tab*0            Sig: Take 1-2 tablets (5-10 mg) by mouth every 4 hours           as needed for moderate to severe pain          Last Written Prescription Date:  09/24/2020  Last Fill Quantity: 15,   # refills: 0  Last Office Visit: 07/20/20  Future Office visit: 10/19/2020      Routing refill request to provider      Mee Delaney, RN, BSN, PRATIK  RN Care Coordinator  [facility]  [phone]

## 2020-09-28 NOTE — PROGRESS NOTES
David called in to clinic with the following concerns:    1. Pain med: He is down to 2 oxycodone tablets and is wondering if he can get a refill. He has been taking 2 tablets, 4 times a day as well as ibuprofen every 4 to 6 hours for his surgical pain. He reports he is working on weaning down on the oxycodone.    2. Pain: He reports he was stretching to reach his daughter in her car seat yesterday and feels like he pulled his stomach area. He reports the incision on the front of his chest is hurting more since he did that. He denies any drainage or opening in the incision.     3. Follow-up: Pt has tried getting scheduled with his PCP for post-hospital follow-up but reports he cannot get in until Oct 18th. Per chart review, pt was advised to follow-up with his PCP within 1-2 weeks after discharge.      Recommendations:    1. Writer will route oxycodone refill request to Dr. Francis. Writer explained David should be taking ibuprofen every 6-8 hours, instead of every 4 hours.     2. Writer instructed David to continue to monitor the incision for any increased drainage, dehiscence, increased redness/warmth. If his pain becomes uncontrollable with the prescribed pain medicine he should call us.     3. David will try calling the general scheduling number to see if there are other locations to see another PCP.     David verbalized understanding with the above information and is in agreement with the plan. Writer will update Dr. Francis.     Mee Delaney, RN, BSN, PRATIK  RN Care Coordinator  [facility]  [phone]

## 2020-09-29 LAB — COPATH REPORT: NORMAL

## 2020-09-30 ENCOUNTER — PATIENT OUTREACH (OUTPATIENT)
Dept: ONCOLOGY | Facility: CLINIC | Age: 40
End: 2020-09-30

## 2020-09-30 NOTE — PROGRESS NOTES
David called in to clinic reporting he was able to get a provider visit with primary care in [place] on 10/8. Per the discharge note from 9/24/20, Thoracic surgery recommended follow up within 1-2 weeks with his PCP and then in 1 month with Dr. Francis.     David is scheduled with Dr. Francis 10/19 and is aware he needs to get his CXR done prior to this appointment.     David reports his pain is still there but that he might be pushing himself too much. Pt reports he is down to taking about 6 tablets of oxycodone per day with Tylenol or ibuprofen in between doses. Writer encouraged David to continue to wean down on the oxycodone and to not push his activity level too quickly to let his body heal from surgery.     David verbalized understanding and is in agreement with the plan.     Mee Delaney, RN, BSN, PRATIK  RN Care Coordinator  [facility]  [phone]

## 2020-10-06 ENCOUNTER — PATIENT OUTREACH (OUTPATIENT)
Dept: ONCOLOGY | Facility: CLINIC | Age: 40
End: 2020-10-06

## 2020-10-06 NOTE — LETTER
October 7, 2020      TO: David Crane  [address]  [place] [address]         To Whom It May Concern,    David sees Dr. Cedrick Francis for Mass of Pleura. On September 23, 2020, Dr. Francis performed a Robot-assisted Right Thoracoscopic Wedge Resection surgical procedure. David was required to stop working September 22, 2020 in preparation for surgery. Dr. Francis is recommending David continue to be off work for 8 weeks after surgery and anticipates he should be able to return to work after November 18, 2020. If you need any additional information, please contact me, Dr. Francis's RN Care Coordinator.      Sincerely,    Mee Delaney, BSN, RN, [facility]  Nurse Care Coordinator for Dr. Francis

## 2020-10-06 NOTE — PROGRESS NOTES
Patient called to request a call from Mee to discuss information needed for his union. He does not have specific paperwork to fill out.  Octavia Rizvi RN on 10/6/2020 at 2:45 PM

## 2020-10-07 ENCOUNTER — MYC MEDICAL ADVICE (OUTPATIENT)
Dept: ONCOLOGY | Facility: CLINIC | Age: 40
End: 2020-10-07

## 2020-10-07 NOTE — PROGRESS NOTES
Writer called David back. He is requesting additional documentation be sent to his Union about him being off work. Shrutir asked David if he can sign a Release Of Information form to share PHI with his union.     Shrutir will try to sent to David via My Chart.    Mee Delaney RN, BSN, PRATIK  RN Care Coordinator  [facility]  [phone]

## 2020-10-07 NOTE — PROGRESS NOTES
David called writer back reporting the union just needs diagnosis, the amount of time he's already been off work, and Dr. Francis's recommendation for how long to be off work after surgery.     Writer will submit this letter to David's My Chart and requested him to call if there is any additional information needed.     Mee Delaney, RN, BSN, PRATIK  RN Care Coordinator  [facility]  [phone]

## 2020-10-08 ENCOUNTER — PATIENT OUTREACH (OUTPATIENT)
Dept: ONCOLOGY | Facility: CLINIC | Age: 40
End: 2020-10-08

## 2020-10-08 NOTE — PROGRESS NOTES
Pt called in to clinic reporting he was originally scheduled for post-hospital follow-up with his PCP today but missed his appt so it was rescheduled for next Thursday. Pt is asking if this is ok.    He reports he is doing well post-op so far. He denies fever, discharge, redness. He reports his only issues is occasional pain when moving around but it is tolerable.    Writer will notify Dr. Francis that pt's post-hospital visit will be one week later than the recommended 1-2 week after hospital discharge timing.     Writer confirmed Dr. Francis's appt with David on 10/19 and gave pt radiology scheduling number for scheduling his CXR beforehand.    Mee Delaney, RN, BSN, PRATIK  RN Care Coordinator  [facility]  [phone]

## 2020-10-09 NOTE — PROGRESS NOTES
Cedrick Francis MD Anderson, Amanda E, RN   Caller: Unspecified (Yesterday,  2:47 PM)             Ok.     Cedrick Ceballos

## 2020-10-12 NOTE — TELEPHONE ENCOUNTER
Writer faxed completed [place] paperwork to [employer], signed by Dr. Francis.     Fax transmission confirmed via Right Fax.     Copy sent for scanning.     Mee Delaney, RN, BSN, PRATIK  RN Care Coordinator  [facility]  [phone]

## 2020-10-13 NOTE — PROGRESS NOTES
Pre-Visit Planning :     Future Appointments   Date Time Provider Department Center   10/15/2020  2:00 PM Nader Lui MD [facility]   10/19/2020 11:30 AM Cedrick Francis MD [facility]      Arrival Time for this Appointment:  1:40 PM      Appointment Notes for this encounter:    establish care-follow up surgery      Questionnaires Reviewed/Assigned:   N/A    Spoke to patient via phone. Are there any additional questions or concerns you'd like to review with your provider during your visit? N/A     Last OV with provider:  9/22/2020; Svitlana Wakefield PA-C;    Pleural mass 08/27/2020       Priority: Medium       Added automatically from request for surgery [id number]        Mass of pleura 07/20/2020       Priority: Medium     Exotropia 12/29/2005       Priority: Medium       Problem list name updated by automated process. Provider to review        Tobacco use disorder 12/15/2004       Priority: Medium     MYOPIA [367.1] 12/15/2004       Priority: Medium       uses corrective lenses    Hospital ER Visits:  9/23/2020-9/24/2020; Cedrick Francis MD; [facility]; Robotic right thorascopic wedge resection    Is the visit preventive? No      Specialty Visits:  8/27/2020; [facility]; Dr. Francis; Mass of pleura         Imaging and Lab Review:10/15/2020; XR Chest 2 Views; Mass of pleura ; Ordering in Verbal with readback mode    Recent Procedures:   8/27/2020; [facility]; Dr. Francis; Mass of pleura    MEDS ;    Current Outpatient Medications   Medication     acetaminophen (TYLENOL) 325 MG tablet     albuterol (PROAIR HFA/PROVENTIL HFA/VENTOLIN HFA) 108 (90 Base) MCG/ACT inhaler     fluticasone (FLONASE) 50 MCG/ACT nasal spray     ibuprofen (ADVIL/MOTRIN) 600 MG tablet     MULTIVITAMIN/MINERAL FORMULA OR TABS     nicotine (COMMIT) 2 MG lozenge     oxyCODONE (ROXICODONE) 5 MG tablet     No current facility-administered medications for this  visit.       Is there anything on your medication list that needs to be updated?  Please ask pt @ check-in    Health Maintenance Due   Topic Date Due     PREVENTIVE CARE VISIT  1982     ASTHMA ACTION PLAN  1982     Pneumococcal Vaccine: Pediatrics (0 to 5 Years) and At-Risk Patients (6 to 64 Years) (1 of 1 - PPSV23) 01/09/1988     DTAP/TDAP/TD IMMUNIZATION (1 - Tdap) 01/09/2007     LIPID  01/09/2017     PHQ-2  01/01/2020     INFLUENZA VACCINE (1) 09/01/2020     Preferred pharmacy: [id number] - [place], [place] - [address] [facility]    MyChart: YES    Patient preferred phone number: [phone]    Neli Hope, Registered Nurse, Patient [facility]   [phone]

## 2020-10-15 ENCOUNTER — ANCILLARY PROCEDURE (OUTPATIENT)
Dept: GENERAL RADIOLOGY | Facility: CLINIC | Age: 40
End: 2020-10-15
Attending: THORACIC SURGERY (CARDIOTHORACIC VASCULAR SURGERY)
Payer: COMMERCIAL

## 2020-10-15 ENCOUNTER — OFFICE VISIT (OUTPATIENT)
Dept: FAMILY MEDICINE | Facility: CLINIC | Age: 40
End: 2020-10-15
Payer: COMMERCIAL

## 2020-10-15 VITALS
BODY MASS INDEX: 33.19 KG/M2 | OXYGEN SATURATION: 96 % | DIASTOLIC BLOOD PRESSURE: 86 MMHG | SYSTOLIC BLOOD PRESSURE: 142 MMHG | RESPIRATION RATE: 18 BRPM | HEART RATE: 80 BPM | WEIGHT: 238 LBS | TEMPERATURE: 97.4 F

## 2020-10-15 DIAGNOSIS — F17.200 TOBACCO USE DISORDER: ICD-10-CM

## 2020-10-15 DIAGNOSIS — R03.0 ELEVATED BLOOD PRESSURE READING WITHOUT DIAGNOSIS OF HYPERTENSION: ICD-10-CM

## 2020-10-15 DIAGNOSIS — Z13.220 SCREENING FOR HYPERLIPIDEMIA: ICD-10-CM

## 2020-10-15 DIAGNOSIS — Z00.00 ENCOUNTER FOR PREVENTIVE CARE: Primary | ICD-10-CM

## 2020-10-15 DIAGNOSIS — R61 GENERALIZED HYPERHIDROSIS: ICD-10-CM

## 2020-10-15 DIAGNOSIS — N20.0 NEPHROLITHIASIS: ICD-10-CM

## 2020-10-15 DIAGNOSIS — Z28.39 IMMUNIZATION DEFICIENCY: ICD-10-CM

## 2020-10-15 DIAGNOSIS — D21.9 BENIGN SOLITARY FIBROUS NEOPLASM: ICD-10-CM

## 2020-10-15 PROBLEM — J94.8 PLEURAL MASS: Status: RESOLVED | Noted: 2020-08-27 | Resolved: 2020-10-15

## 2020-10-15 PROBLEM — J94.8: Status: RESOLVED | Noted: 2020-07-20 | Resolved: 2020-10-15

## 2020-10-15 LAB
ALBUMIN UR-MCNC: NEGATIVE MG/DL
APPEARANCE UR: CLEAR
BILIRUB UR QL STRIP: NEGATIVE
COLOR UR AUTO: YELLOW
GLUCOSE UR STRIP-MCNC: NEGATIVE MG/DL
HGB UR QL STRIP: NEGATIVE
KETONES UR STRIP-MCNC: NEGATIVE MG/DL
LEUKOCYTE ESTERASE UR QL STRIP: NEGATIVE
NITRATE UR QL: NEGATIVE
PH UR STRIP: 6 PH (ref 5–7)
SOURCE: NORMAL
SP GR UR STRIP: 1.02 (ref 1–1.03)
UROBILINOGEN UR STRIP-ACNC: 0.2 EU/DL (ref 0.2–1)

## 2020-10-15 PROCEDURE — 99395 PREV VISIT EST AGE 18-39: CPT | Performed by: FAMILY MEDICINE

## 2020-10-15 PROCEDURE — 99000 SPECIMEN HANDLING OFFICE-LAB: CPT | Performed by: FAMILY MEDICINE

## 2020-10-15 PROCEDURE — 36415 COLL VENOUS BLD VENIPUNCTURE: CPT | Performed by: FAMILY MEDICINE

## 2020-10-15 PROCEDURE — 71046 X-RAY EXAM CHEST 2 VIEWS: CPT | Performed by: RADIOLOGY

## 2020-10-15 PROCEDURE — 84403 ASSAY OF TOTAL TESTOSTERONE: CPT | Mod: 90 | Performed by: FAMILY MEDICINE

## 2020-10-15 PROCEDURE — 81003 URINALYSIS AUTO W/O SCOPE: CPT | Performed by: FAMILY MEDICINE

## 2020-10-15 PROCEDURE — 84443 ASSAY THYROID STIM HORMONE: CPT | Performed by: FAMILY MEDICINE

## 2020-10-15 PROCEDURE — 80061 LIPID PANEL: CPT | Performed by: FAMILY MEDICINE

## 2020-10-15 PROCEDURE — 83721 ASSAY OF BLOOD LIPOPROTEIN: CPT | Mod: 59 | Performed by: FAMILY MEDICINE

## 2020-10-15 PROCEDURE — 80053 COMPREHEN METABOLIC PANEL: CPT | Performed by: FAMILY MEDICINE

## 2020-10-15 ASSESSMENT — ENCOUNTER SYMPTOMS
NERVOUS/ANXIOUS: 0
DIARRHEA: 0
DIZZINESS: 0
NAUSEA: 0
HEMATOCHEZIA: 0
SORE THROAT: 0
CONSTIPATION: 0
ABDOMINAL PAIN: 0
COUGH: 0
WEAKNESS: 0
DYSURIA: 0
HEADACHES: 0
CHILLS: 0
PALPITATIONS: 0
JOINT SWELLING: 0
FEVER: 0
EYE PAIN: 0
FREQUENCY: 0
HEARTBURN: 0
HEMATURIA: 0
SHORTNESS OF BREATH: 0
ARTHRALGIAS: 0
PARESTHESIAS: 0
MYALGIAS: 0

## 2020-10-15 NOTE — ASSESSMENT & PLAN NOTE
BP Readings from Last 6 Encounters:   10/15/20 (!) 142/86   09/24/20 136/75   09/22/20 138/86   01/13/20 133/85   06/10/19 142/86   05/24/19 132/76   discussed tee history. Monitor

## 2020-10-15 NOTE — PROGRESS NOTES
"Encounter for preventive care  (primary encounter diagnosis)    Patient requests \"testosterone hormone panel\".  He reports that he wants \"bragging rights\".  Discussed recommendations of the Society of endocrinology as well as the complications of testosterone supplementation in the absence of hypogonadism.     ROS  He has no symptoms of hypogonadism    BP (!) 142/86 (BP Location: Right arm, Patient Position: Chair)   Pulse 80   Temp 97.4  F (36.3  C) (Oral)   Resp 18   Wt 108 kg (238 lb)   SpO2 96%   BMI 33.19 kg/m      ASSESSMENT / PLAN:  (Z00.00) Encounter for preventive care  (primary encounter diagnosis)  Comment he isnsists  Plan: Testosterone, total        patient satisfaction          Nader Lui MD      "

## 2020-10-15 NOTE — ASSESSMENT & PLAN NOTE
Incidental on recent scans.  He believes he may have passed a stone once in the past.  However his description of penile pain is not classic

## 2020-10-15 NOTE — PROGRESS NOTES
Answers for HPI/ROS submitted by the patient on 10/15/2020   Chronic problems general questions HPI Form  How many servings of fruits and vegetables do you eat daily?: 2-3  On average, how many sweetened beverages do you drink each day (Examples: soda, juice, sweet tea, etc.  Do NOT count diet or artificially sweetened beverages)?: 1  How many minutes a day do you exercise enough to make your heart beat faster?: 60 or more  How many days a week do you exercise enough to make your heart beat faster?: 6  How many days per week do you miss taking your medication?: 0

## 2020-10-15 NOTE — ASSESSMENT & PLAN NOTE
"He reports that he breaks out in \"whole body\"/periodically for unknown reasons.  Discussed differential, his recent evaluation.  Treatment options discussed.  Broaden database  "

## 2020-10-15 NOTE — PROGRESS NOTES
"Subjective     David Crane is a 40 year old male who presents to clinic today for the following health issues:    History of Present Illness       He eats 2-3 servings of fruits and vegetables daily.He consumes 1 sweetened beverage(s) daily.He exercises with enough effort to increase his heart rate 60 or more minutes per day.  He exercises with enough effort to increase his heart rate 6 days per week.   He is taking medications regularly.  Patient is here to \"establish care with a primary\"    Past Medical History:   Diagnosis Date     Benign solitary fibrous neoplasm     pleura     Generalized hyperhidrosis 10/15/2020     Immunization deficiency 10/15/2020     Nephrolithiasis 10/15/2020     Other, mixed, or unspecified nondependent drug abuse, in remission     History of IV drug use, methamphetamine and other drugs.       Septic arthritis of hip (H)        Past Surgical History:   Procedure Laterality Date     DAVINCI LOBECTOMY LUNG Right 2020    Procedure: Robot-assisted right thoracoscopic Wedge resection;  Surgeon: Cedrick Francis MD;  Location:  OR      STRABISMUS SURG,TWO HORIZ MUSCLE  06    LT     LASER SURGERY OF EYE         Family History   Problem Relation Age of Onset     Multiple Sclerosis Mother      Uterine Cancer Mother      Diabetes Maternal Grandmother      Lung Cancer Maternal Grandmother         lung cancer,heavy smoker     Cerebrovascular Disease Maternal Grandfather      Eye Disorder Maternal Aunt         strabismus     Heart Disease Paternal Grandfather         had major heart attack and      Eye Disorder Other         cousin- strabismus     Lung Cancer Maternal Uncle      Hypertension No family hx of      Breast Cancer No family hx of      Cancer - colorectal No family hx of      Prostate Cancer No family hx of      Lipids No family hx of      Neurologic Disorder No family hx of      Respiratory No family hx of      Thyroid Disease No family hx of        Social History "     Tobacco Use     Smoking status: Former Smoker     Packs/day: 1.50     Years: 13.00     Pack years: 19.50     Types: Cigarettes     Start date: 6/10/1997     Quit date: 6/10/2010     Years since quitting: 10.3     Smokeless tobacco: Former User     Types: Snuff     Tobacco comment: rarely smokes 1 with his dad   Substance Use Topics     Alcohol use: Not Currently             Follow up on test result on recent procedure       Review of Systems   Constitutional: Negative for chills and fever.   HENT: Negative for congestion, ear pain, hearing loss and sore throat.    Eyes: Negative for pain and visual disturbance.   Respiratory: Negative for cough and shortness of breath.    Cardiovascular: Negative for chest pain, palpitations and peripheral edema.   Gastrointestinal: Negative for abdominal pain, constipation, diarrhea, heartburn, hematochezia and nausea.   Genitourinary: Negative for discharge, dysuria, frequency, genital sores, hematuria, impotence and urgency.   Musculoskeletal: Negative for arthralgias, joint swelling and myalgias.   Skin: Negative for rash.   Neurological: Negative for dizziness, weakness, headaches and paresthesias.   Psychiatric/Behavioral: Negative for mood changes. The patient is not nervous/anxious.             Objective    BP (!) 142/86 (BP Location: Right arm, Patient Position: Chair)   Pulse 80   Temp 97.4  F (36.3  C) (Oral)   Resp 18   Wt 108 kg (238 lb)   SpO2 96%   BMI 33.19 kg/m    Body mass index is 33.19 kg/m .   BP (!) 142/86 (BP Location: Right arm, Patient Position: Chair)   Pulse 80   Temp 97.4  F (36.3  C) (Oral)   Resp 18   Wt 108 kg (238 lb)   SpO2 96%   BMI 33.19 kg/m      Physical Exam  Constitutional:       Appearance: Normal appearance.   HENT:      Head: Atraumatic.      Right Ear: Tympanic membrane normal.      Left Ear: Tympanic membrane normal.      Nose: Nose normal.      Mouth/Throat:      Mouth: Mucous membranes are moist.   Eyes:      Pupils:  "Pupils are equal, round, and reactive to light.   Neck:      Musculoskeletal: Neck supple.   Cardiovascular:      Rate and Rhythm: Normal rate and regular rhythm.      Heart sounds: Normal heart sounds.   Pulmonary:      Effort: Pulmonary effort is normal.      Breath sounds: Normal breath sounds.   Abdominal:      General: Abdomen is flat. Bowel sounds are normal.   Skin:     General: Skin is warm and dry.   Neurological:      General: No focal deficit present.      Mental Status: He is alert.   Psychiatric:         Mood and Affect: Mood normal.     Surgical wounds clean and dry              Assessment & Plan   Problem List Items Addressed This Visit        Musculoskeletal and Integumentary    Generalized hyperhidrosis     He reports that he breaks out in \"whole body\"/periodically for unknown reasons.  Discussed differential, his recent evaluation.  Treatment options discussed.  Broaden database         Relevant Orders    TSH with free T4 reflex (Completed)       Urinary    Nephrolithiasis     Incidental on recent scans.  He believes he may have passed a stone once in the past.  However his description of penile pain is not classic         Relevant Orders    *UA reflex to Microscopic and Culture ([facility] (except [place] and [place]) (Completed)       Behavioral    Tobacco use disorder      He is using gum, almost completely quit. Positive strokes            Other    Benign solitary fibrous neoplasm     CXR per oncology         Immunization deficiency     He eschews all         Elevated blood pressure reading without diagnosis of hypertension     BP Readings from Last 6 Encounters:   10/15/20 (!) 142/86   09/24/20 136/75   09/22/20 138/86   01/13/20 133/85   06/10/19 142/86   05/24/19 132/76   discussed tee history. Monitor           Relevant Orders    Comprehensive metabolic panel (BMP + Alb, Alk Phos, ALT, AST, Total. Bili, TP) (Completed)      Other Visit Diagnoses     Encounter for " "preventive care    -  Primary    Relevant Orders    Testosterone, total (Completed)    Screening for hyperlipidemia        Relevant Orders    Lipid panel reflex to direct LDL Non-fasting (Completed)         He denies a history of asthma, noting inhaler was prescribed during his encounter for pleural mass.    BMI:   Estimated body mass index is 33.19 kg/m  as calculated from the following:    Height as of 9/23/20: 1.803 m (5' 11\").    Weight as of this encounter: 108 kg (238 lb).   Weight management plan: He proposes exercise when healed             Return in about 1 year (around 10/15/2021) for Physical Exam.    Nader Lui MD  [facility]    "

## 2020-10-16 LAB
ALBUMIN SERPL-MCNC: 3.9 G/DL (ref 3.4–5)
ALP SERPL-CCNC: 119 U/L (ref 40–150)
ALT SERPL W P-5'-P-CCNC: 68 U/L (ref 0–70)
ANION GAP SERPL CALCULATED.3IONS-SCNC: 9 MMOL/L (ref 3–14)
AST SERPL W P-5'-P-CCNC: 24 U/L (ref 0–45)
BILIRUB SERPL-MCNC: 0.4 MG/DL (ref 0.2–1.3)
BUN SERPL-MCNC: 16 MG/DL (ref 7–30)
CALCIUM SERPL-MCNC: 8.7 MG/DL (ref 8.5–10.1)
CHLORIDE SERPL-SCNC: 107 MMOL/L (ref 94–109)
CHOLEST SERPL-MCNC: 289 MG/DL
CO2 SERPL-SCNC: 21 MMOL/L (ref 20–32)
CREAT SERPL-MCNC: 0.7 MG/DL (ref 0.66–1.25)
GFR SERPL CREATININE-BSD FRML MDRD: >90 ML/MIN/{1.73_M2}
GLUCOSE SERPL-MCNC: 102 MG/DL (ref 70–99)
HDLC SERPL-MCNC: 40 MG/DL
LDLC SERPL CALC-MCNC: ABNORMAL MG/DL
LDLC SERPL DIRECT ASSAY-MCNC: 164 MG/DL
NONHDLC SERPL-MCNC: 249 MG/DL
POTASSIUM SERPL-SCNC: 3.8 MMOL/L (ref 3.4–5.3)
PROT SERPL-MCNC: 7.3 G/DL (ref 6.8–8.8)
SODIUM SERPL-SCNC: 137 MMOL/L (ref 133–144)
TRIGL SERPL-MCNC: 560 MG/DL
TSH SERPL DL<=0.005 MIU/L-ACNC: 0.98 MU/L (ref 0.4–4)

## 2020-10-17 LAB — TESTOST SERPL-MCNC: 377 NG/DL (ref 240–950)

## 2020-10-18 DIAGNOSIS — R73.09 ELEVATED GLUCOSE: ICD-10-CM

## 2020-10-18 DIAGNOSIS — E78.5 HYPERLIPIDEMIA LDL GOAL <160: ICD-10-CM

## 2020-10-18 NOTE — RESULT ENCOUNTER NOTE
All tests are not too bad. Your cholesterol pattern is not favorable. We should check your triglycerides fasting: please make a fasting lab only appointment. Medicine may be needed   Nader Lui MD        Needs fasting lab /BP only appt  Nader Lui MD

## 2020-10-20 ENCOUNTER — VIRTUAL VISIT (OUTPATIENT)
Dept: ONCOLOGY | Facility: CLINIC | Age: 40
End: 2020-10-20
Attending: THORACIC SURGERY (CARDIOTHORACIC VASCULAR SURGERY)
Payer: COMMERCIAL

## 2020-10-20 DIAGNOSIS — J94.8 PLEURAL MASS: ICD-10-CM

## 2020-10-20 DIAGNOSIS — Z48.89 POSTOPERATIVE VISIT: Primary | ICD-10-CM

## 2020-10-20 PROCEDURE — 99212 OFFICE O/P EST SF 10 MIN: CPT | Mod: 95 | Performed by: PHYSICIAN ASSISTANT

## 2020-10-20 PROCEDURE — 999N001193 HC VIDEO/TELEPHONE VISIT; NO CHARGE

## 2020-10-20 NOTE — LETTER
"    10/20/2020         RE: David Crane  [address]  [facility] [address]        Dear Colleague,    Thank you for referring your patient, Dvaid Crane, to the [facility]. Please see a copy of my visit note below.    David Crane is a 40 year old male who is being evaluated via a billable video visit.      The patient has been notified of following:     \"This video visit will be conducted via a call between you and your physician/provider. We have found that certain health care needs can be provided without the need for an in-person physical exam.  This service lets us provide the care you need with a video conversation.  If a prescription is necessary we can send it directly to your pharmacy.  If lab work is needed we can place an order for that and you can then stop by our lab to have the test done at a later time.    Video visits are billed at different rates depending on your insurance coverage.  Please reach out to your insurance provider with any questions.    If during the course of the call the physician/provider feels a video visit is not appropriate, you will not be charged for this service.\"    Patient has given verbal consent for Video visit? Yes  How would you like to obtain your AVS? MyChart  If you are dropped from the video visit, the video invite should be resent to: Send to e-mail at: [email or web address]     MYCHART VIDEO VISIT    Will anyone else be joining your video visit? No       - discuss return to work. Pain is controlled unless active    Nikky Kurtz CMA      Video-Visit Details    Type of service:  Video Visit    Video Start Time: 1:4PM  Video End Time: 1:58 PM    Originating Location (pt. Location): Home    Distant Location (provider location):  [facility]     Platform used for Video Visit: Helga Moreno PA-C       THORACIC SURGERY FOLLOW UP VISIT    I saw Mr. Crane in follow-up " today. The clinical summary follows:     PREOP DIAGNOSIS   Lung mass  NEODJUVANT THERAPY     PROCEDURE  Robotic-assisted right thoracoscopic wedge    DATE OF PROCEDURE  9/23/2020    HISTOPATHOLOGY   Patient Name: LEO CRANE   MR#: [id number]   Specimen #: [id number]   Collected: 9/23/2020   Received: 9/23/2020   Reported: 9/29/2020 16:21   Ordering Phy(s): CARLOS BARRIOS     For improved result formatting, select 'View Enhanced Report Format' under    Linked Documents section.     SPECIMEN(S):   Right lower lobe wedge     FINAL DIAGNOSIS:   Pleural mass with lung, right lower lobe; tumor resection and lung wedge   resection:   - Solitary fibrous tumor   - Tumor size: 5.2 cm   - Benign lung tissue without tumor involvement   - Surgical resection margins free of tumor     COMPLICATIONS  None    INTERVAL STUDIES  TOB former smokeless tobacco user    SUBJECTIVE   Leo is feeling much better today.  He no longer takes narcotics for pain and he has been taking ibuprofen twice a day.  He has been lifting his daughter occasionally and she is about 30 pounds.  He does have intermittent twinges of pain in that left chest, but overall getting better.  Appetite is been okay.  No vomiting or diarrhea, fever or chills.  He has near the end of FMLA and will be going back to work in about 4 weeks, but he may try go back in 2 weeks.  No new concerns.    From a personal perspective, he is  and has 1 daughter, 3 years old.    IMPRESSION   (Z48.89) Postoperative visit  (primary encounter diagnosis)  (J94.8) Pleural mass    Leo Crane is a 40 year old male with a lung mass status post thorascopic wedge, pathology benign.  He is covering well.  Ibuprofen for pain.  He may need to work note, but he will let the clinic know.    PLAN  I spent a total of 10 minutes with Mr. Leo Crane, more than 50% of which were spent in counseling, coordination of care, and face-to-face time. I reviewed the plan as follows:  1. Pain  Control Plan: Motrin PRN    All questions were answered and the patient and present family were in agreement with the plan.  I appreciate the opportunity to participate in the care of your patient and will keep you updated.  Sincerely,    Ivania Moreno PA-C  Thoracic Surgery  [facility]              Again, thank you for allowing me to participate in the care of your patient.        Sincerely,        Ivania Moreno PA-C

## 2020-10-20 NOTE — LETTER
"    10/20/2020         RE: David Crane  [address]  [facility] [address]        Dear Colleague,    Thank you for referring your patient, David Crane, to the [facility]. Please see a copy of my visit note below.    David Crane is a 40 year old male who is being evaluated via a billable video visit.      The patient has been notified of following:     \"This video visit will be conducted via a call between you and your physician/provider. We have found that certain health care needs can be provided without the need for an in-person physical exam.  This service lets us provide the care you need with a video conversation.  If a prescription is necessary we can send it directly to your pharmacy.  If lab work is needed we can place an order for that and you can then stop by our lab to have the test done at a later time.    Video visits are billed at different rates depending on your insurance coverage.  Please reach out to your insurance provider with any questions.    If during the course of the call the physician/provider feels a video visit is not appropriate, you will not be charged for this service.\"    Patient has given verbal consent for Video visit? Yes  How would you like to obtain your AVS? MyChart  If you are dropped from the video visit, the video invite should be resent to: Send to e-mail at: [email or web address]     MYCHART VIDEO VISIT    Will anyone else be joining your video visit? No       - discuss return to work. Pain is controlled unless active    Nikky Kurtz CMA      Video-Visit Details    Type of service:  Video Visit    Video Start Time: 1:4PM  Video End Time: 1:58 PM    Originating Location (pt. Location): Home    Distant Location (provider location):  [facility]     Platform used for Video Visit: Helga Moreno PA-C       THORACIC SURGERY FOLLOW UP VISIT    I saw Mr. Crane in follow-up " today. The clinical summary follows:     PREOP DIAGNOSIS   Lung mass  NEODJUVANT THERAPY     PROCEDURE  Robotic-assisted right thoracoscopic wedge    DATE OF PROCEDURE  9/23/2020    HISTOPATHOLOGY   Patient Name: LEO CRANE   MR#: [id number]   Specimen #: [id number]   Collected: 9/23/2020   Received: 9/23/2020   Reported: 9/29/2020 16:21   Ordering Phy(s): CARLOS BARRIOS     For improved result formatting, select 'View Enhanced Report Format' under    Linked Documents section.     SPECIMEN(S):   Right lower lobe wedge     FINAL DIAGNOSIS:   Pleural mass with lung, right lower lobe; tumor resection and lung wedge   resection:   - Solitary fibrous tumor   - Tumor size: 5.2 cm   - Benign lung tissue without tumor involvement   - Surgical resection margins free of tumor     COMPLICATIONS  None    INTERVAL STUDIES  TOB former smokeless tobacco user    SUBJECTIVE   Leo is feeling much better today.  He no longer takes narcotics for pain and he has been taking ibuprofen twice a day.  He has been lifting his daughter occasionally and she is about 30 pounds.  He does have intermittent twinges of pain in that left chest, but overall getting better.  Appetite is been okay.  No vomiting or diarrhea, fever or chills.  He has near the end of FMLA and will be going back to work in about 4 weeks, but he may try go back in 2 weeks.  No new concerns.    From a personal perspective, he is  and has 1 daughter, 3 years old.    IMPRESSION   (Z48.89) Postoperative visit  (primary encounter diagnosis)  (J94.8) Pleural mass    Leo Crane is a 40 year old male with a lung mass status post thorascopic wedge, pathology benign.  He is covering well.  Ibuprofen for pain.  He may need to work note, but he will let the clinic know.    PLAN  I spent a total of 10 minutes with Mr. Loe Crane, more than 50% of which were spent in counseling, coordination of care, and face-to-face time. I reviewed the plan as follows:  1. Pain  Control Plan: Motrin PRN    All questions were answered and the patient and present family were in agreement with the plan.  I appreciate the opportunity to participate in the care of your patient and will keep you updated.  Sincerely,    Ivania Moreno PA-C  Thoracic Surgery  [facility]              Again, thank you for allowing me to participate in the care of your patient.        Sincerely,        Ivania Moreno PA-C

## 2020-10-20 NOTE — PROGRESS NOTES
"Leo Crane is a 40 year old male who is being evaluated via a billable video visit.      The patient has been notified of following:     \"This video visit will be conducted via a call between you and your physician/provider. We have found that certain health care needs can be provided without the need for an in-person physical exam.  This service lets us provide the care you need with a video conversation.  If a prescription is necessary we can send it directly to your pharmacy.  If lab work is needed we can place an order for that and you can then stop by our lab to have the test done at a later time.    Video visits are billed at different rates depending on your insurance coverage.  Please reach out to your insurance provider with any questions.    If during the course of the call the physician/provider feels a video visit is not appropriate, you will not be charged for this service.\"    Patient has given verbal consent for Video visit? Yes  How would you like to obtain your AVS? MyChart  If you are dropped from the video visit, the video invite should be resent to: Send to e-mail at: [email or web address]     MYCHART VIDEO VISIT    Will anyone else be joining your video visit? No       - discuss return to work. Pain is controlled unless active    Nikky Kurtz CMA      Video-Visit Details    Type of service:  Video Visit    Video Start Time: 1:4PM  Video End Time: 1:58 PM    Originating Location (pt. Location): Home    Distant Location (provider location):  [facility]     Platform used for Video Visit: Helga Moreno PA-C       THORACIC SURGERY FOLLOW UP VISIT    I saw Mr. Crane in follow-up today. The clinical summary follows:     PREOP DIAGNOSIS   Lung mass  NEODJUVANT THERAPY     PROCEDURE  Robotic-assisted right thoracoscopic wedge    DATE OF PROCEDURE  9/23/2020    HISTOPATHOLOGY   Patient Name: LEO CRANE   MR#: [id number]   Specimen #: [id number] "   Collected: 9/23/2020   Received: 9/23/2020   Reported: 9/29/2020 16:21   Ordering Phy(s): CARLOS BARRIOS     For improved result formatting, select 'View Enhanced Report Format' under    Linked Documents section.     SPECIMEN(S):   Right lower lobe wedge     FINAL DIAGNOSIS:   Pleural mass with lung, right lower lobe; tumor resection and lung wedge   resection:   - Solitary fibrous tumor   - Tumor size: 5.2 cm   - Benign lung tissue without tumor involvement   - Surgical resection margins free of tumor     COMPLICATIONS  None    INTERVAL STUDIES  TOB former smokeless tobacco user    SUBJECTIVE   David is feeling much better today.  He no longer takes narcotics for pain and he has been taking ibuprofen twice a day.  He has been lifting his daughter occasionally and she is about 30 pounds.  He does have intermittent twinges of pain in that left chest, but overall getting better.  Appetite is been okay.  No vomiting or diarrhea, fever or chills.  He has near the end of FMLA and will be going back to work in about 4 weeks, but he may try go back in 2 weeks.  No new concerns.    From a personal perspective, he is  and has 1 daughter, 3 years old.    IMPRESSION   (Z48.89) Postoperative visit  (primary encounter diagnosis)  (J94.8) Pleural mass    David Crane is a 40 year old male with a lung mass status post thorascopic wedge, pathology benign.  He is covering well.  Ibuprofen for pain.  He may need to work note, but he will let the clinic know.    PLAN  I spent a total of 10 minutes with Mr. David Crane, more than 50% of which were spent in counseling, coordination of care, and face-to-face time. I reviewed the plan as follows:  1. Pain Control Plan: Motrin PRN    All questions were answered and the patient and present family were in agreement with the plan.  I appreciate the opportunity to participate in the care of your patient and will keep you updated.  Sincerely,    Ivania Moreno PA-C  Thoracic  Surgery  [facility]

## 2020-10-22 ENCOUNTER — PATIENT OUTREACH (OUTPATIENT)
Dept: ONCOLOGY | Facility: CLINIC | Age: 40
End: 2020-10-22

## 2020-10-22 NOTE — PROGRESS NOTES
"Writer was requested by TERESA Redd to call David with financial counselor phone number for him to call.     Writer called and spoke with David and gave him the Financial Counselor number: [phone].    David thanked  for the information. He is also requesting a Return to Work letter be sent to My Chart.     David reports he is planning on returning to work 11/3/2020 and stay \"light duty\" for a few weeks.     Mee Delaney, RN, BSN, PRATIK  RN Care Coordinator  [facility]  [phone]        "

## 2020-10-22 NOTE — LETTER
October 23, 2020      TO: David Crane  [address]  [facility] [address]         To Whom It May Concern,    Mr. David Crane had surgery on September 23, 2020. From a medical standpoint he is able to return to work starting November 3, 2020. David may need light duty work for a few weeks after restarting work, depending on his rate of recovery. If you have any additional questions or concerns please contact my office at [phone].      Sincerely,        Dr. Cedrick Francis  Thoracic Surgery

## 2020-10-23 ENCOUNTER — PATIENT OUTREACH (OUTPATIENT)
Dept: INFUSION THERAPY | Facility: CLINIC | Age: 40
End: 2020-10-23

## 2020-10-23 NOTE — TELEPHONE ENCOUNTER
David called in to clinic reporting he forgot to notify our clinic that he gets drug tested for [address] and has a test coming up on Monday.     David reports he is not sure what specific drugs they test for but is requesting the list of meds given during and after surgery and a letter from Dr. Francis attesting to those meds were needed for surgery and recovery.    Writer will print off information and start letter for Dr. Francis to sign on Monday, 10/26. David would like to  the letter Monday afternoon.     David verbalized understanding and is in agreement with the plan.     Mee Delaney, RN, BSN, PRATIK  RN Care Coordinator  [facility]  [phone]

## 2020-10-23 NOTE — LETTER
October 23, 2020      TO: David Crane  [address]  [place] [address]         To Whom It May Concern,    Mr. David Crane had surgery with Dr. Cedrick Francis on 09/23/2020. Please review the attached documentation for a list of medications administered during surgery and medications prescribed for post-op recovery. If you have any additional questions or concerns, please contact my office at [phone].       Sincerely,        Dr. Cedrick Francis  Thoracic Surgery

## 2020-10-26 ENCOUNTER — ALLIED HEALTH/NURSE VISIT (OUTPATIENT)
Dept: FAMILY MEDICINE | Facility: CLINIC | Age: 40
End: 2020-10-26
Payer: COMMERCIAL

## 2020-10-26 ENCOUNTER — TELEPHONE (OUTPATIENT)
Dept: FAMILY MEDICINE | Facility: CLINIC | Age: 40
End: 2020-10-26

## 2020-10-26 VITALS — DIASTOLIC BLOOD PRESSURE: 80 MMHG | SYSTOLIC BLOOD PRESSURE: 128 MMHG | HEART RATE: 76 BPM

## 2020-10-26 DIAGNOSIS — R03.0 ELEVATED BLOOD PRESSURE READING WITHOUT DIAGNOSIS OF HYPERTENSION: Primary | ICD-10-CM

## 2020-10-26 DIAGNOSIS — R73.09 ELEVATED GLUCOSE: ICD-10-CM

## 2020-10-26 DIAGNOSIS — E78.5 HYPERLIPIDEMIA LDL GOAL <160: ICD-10-CM

## 2020-10-26 LAB
CHOLEST SERPL-MCNC: 295 MG/DL
HBA1C MFR BLD: 5 % (ref 0–5.6)
HDLC SERPL-MCNC: 47 MG/DL
LDLC SERPL CALC-MCNC: 184 MG/DL
NONHDLC SERPL-MCNC: 248 MG/DL
TRIGL SERPL-MCNC: 322 MG/DL

## 2020-10-26 PROCEDURE — 80061 LIPID PANEL: CPT | Performed by: FAMILY MEDICINE

## 2020-10-26 PROCEDURE — 36415 COLL VENOUS BLD VENIPUNCTURE: CPT | Performed by: FAMILY MEDICINE

## 2020-10-26 PROCEDURE — 83036 HEMOGLOBIN GLYCOSYLATED A1C: CPT | Performed by: FAMILY MEDICINE

## 2020-10-26 PROCEDURE — 99207 PR NO CHARGE NURSE ONLY: CPT

## 2020-10-26 NOTE — RESULT ENCOUNTER NOTE
Diabetes test is negative.  Cholesterol pattern is not very good.  We recommend medical therapy if your LDL is 190 or more.  Yours is 184.  Triglycerides are high, but do not need specific treatment.  I would, however, recommend that we put you on a medicine for your cholesterol, to reduce your heart attack risk.  Are you interested?  Nader Lui MD

## 2020-10-26 NOTE — PROGRESS NOTES
.David Crane is a 40 year old patient who comes in today for a Blood Pressure check.  Initial BP:  /80 (BP Location: Left arm, Patient Position: Sitting, Cuff Size: Adult Large)   Pulse 76      76  Disposition: results routed to provider    Yaritza Bhagat MA    BP Readings from Last 6 Encounters:   10/26/20 128/80   10/15/20 (!) 142/86   09/24/20 136/75   09/22/20 138/86   01/13/20 133/85   06/10/19 142/86

## 2020-10-26 NOTE — PROGRESS NOTES
Writer left signed letter at the clinic  for David to . Writer spoke with David today and he was able to stop by clinic and  the letter.     Mee Delaney, RN, BSN, PRATIK  RN Care Coordinator  [facility]  [phone]

## 2020-10-26 NOTE — TELEPHONE ENCOUNTER
Writer placed signed letter and medication lists in envelope at the clinic  for David to  later today.     Mee Delaney, RN, BSN, PRATIK  RN Care Coordinator  [facility]  [phone]

## 2020-11-04 ENCOUNTER — MYC MEDICAL ADVICE (OUTPATIENT)
Dept: ONCOLOGY | Facility: CLINIC | Age: 40
End: 2020-11-04

## 2020-11-05 NOTE — TELEPHONE ENCOUNTER
Writer sent completed disability forms with office visit notes and CT scan result in to the [facility], along with signed BRUNILDA from patient.    Fax transmission confirmed via Right Fax.     Mee Delaney, RN, BSN, PRATIK  RN Care Coordinator  [facility]  [phone]

## 2020-11-29 ENCOUNTER — HEALTH MAINTENANCE LETTER (OUTPATIENT)
Age: 40
End: 2020-11-29

## 2021-09-19 ENCOUNTER — HEALTH MAINTENANCE LETTER (OUTPATIENT)
Age: 41
End: 2021-09-19

## 2021-11-14 ENCOUNTER — HEALTH MAINTENANCE LETTER (OUTPATIENT)
Age: 41
End: 2021-11-14

## 2022-01-09 ENCOUNTER — HEALTH MAINTENANCE LETTER (OUTPATIENT)
Age: 42
End: 2022-01-09

## 2022-07-24 ENCOUNTER — OFFICE VISIT (OUTPATIENT)
Dept: URGENT CARE | Facility: URGENT CARE | Age: 42
End: 2022-07-24
Payer: COMMERCIAL

## 2022-07-24 VITALS
HEART RATE: 88 BPM | WEIGHT: 225 LBS | SYSTOLIC BLOOD PRESSURE: 138 MMHG | BODY MASS INDEX: 31.38 KG/M2 | DIASTOLIC BLOOD PRESSURE: 87 MMHG | OXYGEN SATURATION: 97 % | TEMPERATURE: 98.3 F

## 2022-07-24 DIAGNOSIS — H00.024 HORDEOLUM INTERNUM OF LEFT UPPER EYELID: Primary | ICD-10-CM

## 2022-07-24 PROCEDURE — 99203 OFFICE O/P NEW LOW 30 MIN: CPT | Performed by: INTERNAL MEDICINE

## 2022-07-24 RX ORDER — POLYMYXIN B SULFATE AND TRIMETHOPRIM 1; 10000 MG/ML; [USP'U]/ML
1-2 SOLUTION OPHTHALMIC EVERY 4 HOURS
Qty: 10 ML | Refills: 0 | Status: SHIPPED | OUTPATIENT
Start: 2022-07-24 | End: 2023-01-02

## 2022-07-24 NOTE — PROGRESS NOTES
Assessment & Plan     Hordeolum internum of left upper eyelid  - trimethoprim-polymyxin b (POLYTRIM) 64133-0.1 UNIT/ML-% ophthalmic solution; Place 1-2 drops Into the left eye every 4 hours    Eric Ghotra MD  [facility] URGENT CARE GOLDIEMARICRUZ Hernandez is a 42 year old, presenting for the following health issues:  POSS LEFT EYE INFECTION (ONSET THIS MORNING PER PT WOKE UP THIS MORNING WITH YELLOW PUS COMING OUT OF EYE)      HPI   Concern about some swelling of the left upper eyelid and getting some yellow drainage.  Does not wear contacts.          Objective    /87   Pulse 88   Temp 98.3  F (36.8  C)   Wt 102.1 kg (225 lb)   SpO2 97%   BMI 31.38 kg/m    Body mass index is 31.38 kg/m .  Physical Exam   GENERAL APPEARANCE: healthy, alert and no distress  EYES: swelling and redness of the left upper medial eyelid with small palpable nodule; inversion of the lid demonstrates focal redness several mm distant from the lid margin              .  ..

## 2022-11-21 ENCOUNTER — HEALTH MAINTENANCE LETTER (OUTPATIENT)
Age: 42
End: 2022-11-21

## 2022-11-29 ENCOUNTER — ANCILLARY PROCEDURE (OUTPATIENT)
Dept: GENERAL RADIOLOGY | Facility: CLINIC | Age: 42
End: 2022-11-29
Attending: PHYSICIAN ASSISTANT
Payer: COMMERCIAL

## 2022-11-29 ENCOUNTER — OFFICE VISIT (OUTPATIENT)
Dept: URGENT CARE | Facility: URGENT CARE | Age: 42
End: 2022-11-29
Payer: COMMERCIAL

## 2022-11-29 VITALS
SYSTOLIC BLOOD PRESSURE: 135 MMHG | DIASTOLIC BLOOD PRESSURE: 83 MMHG | OXYGEN SATURATION: 98 % | RESPIRATION RATE: 20 BRPM | TEMPERATURE: 98 F | HEART RATE: 78 BPM

## 2022-11-29 DIAGNOSIS — R07.9 ACUTE CHEST PAIN: ICD-10-CM

## 2022-11-29 DIAGNOSIS — K21.9 GASTROESOPHAGEAL REFLUX DISEASE WITHOUT ESOPHAGITIS: Primary | ICD-10-CM

## 2022-11-29 PROCEDURE — 99214 OFFICE O/P EST MOD 30 MIN: CPT | Performed by: PHYSICIAN ASSISTANT

## 2022-11-29 PROCEDURE — 71046 X-RAY EXAM CHEST 2 VIEWS: CPT | Mod: TC | Performed by: RADIOLOGY

## 2022-11-29 PROCEDURE — 93000 ELECTROCARDIOGRAM COMPLETE: CPT | Mod: 76 | Performed by: PHYSICIAN ASSISTANT

## 2022-11-29 RX ORDER — OMEPRAZOLE 20 MG/1
20 TABLET, DELAYED RELEASE ORAL DAILY
Qty: 30 TABLET | Refills: 0 | Status: SHIPPED | OUTPATIENT
Start: 2022-11-29 | End: 2022-12-23

## 2022-11-29 NOTE — PROGRESS NOTES
SUBJECTIVE:  David Crane is a 42 year old male who presents to the office with the CC of chest pain.  Patient complains of chest pressure/discomfort.  The pain is characterized as moderate burning located left chest awith radiation to right chest. Symptoms began 3 day(s) ago, 3-4 times a day.  Intermittent episodes lasting  1  Hour(s).  SHows up within half hour of eating.  Has not tried to resolve the symptoms.    Pain is associated with eating. Indigestion has been increasing.   Has used Tums in the past.    Pain is exacerbated by lying down and eating .  Pain is relieved by none.  Cardiac risk factors: abnormal lipids    Pain is not present today    Past Medical History:   Diagnosis Date     Benign solitary fibrous neoplasm     pleura     Elevated glucose 10/18/2020     Generalized hyperhidrosis 10/15/2020     Hyperlipidemia LDL goal <160 10/18/2020     Immunization deficiency 10/15/2020     Nephrolithiasis 10/15/2020     Other, mixed, or unspecified nondependent drug abuse, in remission     History of IV drug use, methamphetamine and other drugs.       Septic arthritis of hip (H) 2002         Current Outpatient Medications:      albuterol (PROAIR HFA/PROVENTIL HFA/VENTOLIN HFA) 108 (90 Base) MCG/ACT inhaler, Inhale 1-2 puffs into the lungs daily as needed for shortness of breath / dyspnea or wheezing (Patient not taking: Reported on 7/24/2022), Disp: 1 Inhaler, Rfl: 6     fluticasone (FLONASE) 50 MCG/ACT nasal spray, Spray 1-2 sprays into both nostrils daily (Patient not taking: Reported on 11/29/2022), Disp: 16 g, Rfl: 3     ibuprofen (ADVIL/MOTRIN) 600 MG tablet, Take 1 tablet (600 mg) by mouth every 6 hours (Patient not taking: Reported on 7/24/2022), Disp:  , Rfl:      MULTIVITAMIN/MINERAL FORMULA OR TABS, 1 TABLET DAILY (Patient not taking: Reported on 11/29/2022), Disp: , Rfl:      trimethoprim-polymyxin b (POLYTRIM) 61263-4.1 UNIT/ML-% ophthalmic solution, Place 1-2 drops Into the left eye every 4  hours (Patient not taking: Reported on 2022), Disp: 10 mL, Rfl: 0    Social History     Tobacco Use     Smoking status: Former     Packs/day: 1.50     Years: 13.00     Pack years: 19.50     Types: Cigarettes     Start date: 6/10/1997     Quit date: 6/10/2010     Years since quittin.4     Smokeless tobacco: Former     Types: Snuff     Tobacco comments:     rarely smokes 1 with his dad   Substance Use Topics     Alcohol use: Not Currently       ROS:  Review of systems negative except as stated above.    EXAM:  /83   Pulse 78   Temp 98  F (36.7  C)   Resp 20   SpO2 98%   GENERAL APPEARANCE: healthy, alert and no distress  RESP: lungs clear to auscultation - no rales, rhonchi or wheezes  CV: regular rates and rhythm, normal S1 S2, no murmur noted  NEURO: Normal strength and tone, sensory exam grossly normal,  normal speech and mentation  SKIN: no suspicious lesions or rashes     Results for orders placed or performed in visit on 22   XR Chest 2 Views     Status: None    Narrative    XR CHEST 2 VIEWS  2022 2:45 PM       INDICATION: Acute chest pain  COMPARISON: 10/15/2020       Impression    IMPRESSION: Negative chest. No acute infiltrate or significant change.    MARISELA BENAVIDEZ MD         SYSTEM ID:  [id number]       EKG: NSR    Assessment  / IMPRESSION  (K21.9) Gastroesophageal reflux disease without esophagitis  (primary encounter diagnosis)  Plan: CBC with platelets and differential, omeprazole        (PRILOSEC OTC) 20 MG EC tablet      (R07.9) Acute chest pain  Plan: EKG 12-lead complete w/read - Clinics, EKG         12-lead complete w/read - Clinics, XR Chest 2         Views        Patient Instructions   Use famotidine if intolerant for omeprazol

## 2023-01-02 ENCOUNTER — OFFICE VISIT (OUTPATIENT)
Dept: PEDIATRICS | Facility: CLINIC | Age: 43
End: 2023-01-02
Payer: COMMERCIAL

## 2023-01-02 VITALS
BODY MASS INDEX: 33.6 KG/M2 | OXYGEN SATURATION: 97 % | RESPIRATION RATE: 16 BRPM | HEIGHT: 71 IN | DIASTOLIC BLOOD PRESSURE: 82 MMHG | HEART RATE: 92 BPM | SYSTOLIC BLOOD PRESSURE: 140 MMHG | WEIGHT: 240 LBS | TEMPERATURE: 98.6 F

## 2023-01-02 DIAGNOSIS — R07.89 ATYPICAL CHEST PAIN: ICD-10-CM

## 2023-01-02 DIAGNOSIS — F17.200 TOBACCO USE DISORDER: ICD-10-CM

## 2023-01-02 DIAGNOSIS — R03.0 ELEVATED BLOOD PRESSURE READING WITHOUT DIAGNOSIS OF HYPERTENSION: ICD-10-CM

## 2023-01-02 DIAGNOSIS — K21.00 GASTROESOPHAGEAL REFLUX DISEASE WITH ESOPHAGITIS WITHOUT HEMORRHAGE: Primary | ICD-10-CM

## 2023-01-02 PROCEDURE — 99214 OFFICE O/P EST MOD 30 MIN: CPT | Performed by: INTERNAL MEDICINE

## 2023-01-02 RX ORDER — FLUTICASONE PROPIONATE 50 MCG
1-2 SPRAY, SUSPENSION (ML) NASAL DAILY
Qty: 16 G | Refills: 3 | Status: CANCELLED | OUTPATIENT
Start: 2023-01-02

## 2023-01-02 ASSESSMENT — PAIN SCALES - GENERAL: PAINLEVEL: NO PAIN (0)

## 2023-01-02 NOTE — PATIENT INSTRUCTIONS
Please obtain some additional home readings and record over the next few weeks.  Goal blood pressure is less than 140/90.  If your home blood pressure cuff is out of date, the [employer]ON brand units are reasonable (available on [employer] or at [employer] or [employer]). We generally recommend purchasing an arm cuff automatic unit.        We recommend checking your pressure in the morning after you have been awake for 15 to 30 minutes.  Remember to sit quietly for about 10 minutes before taking the reading and take 2 or 3 readings and take the average.   Make sure cuff size is accurate (not too small)  Please forward some of your results in the next month

## 2023-01-02 NOTE — PROGRESS NOTES
Assessment & Plan     (K21.00) Gastroesophageal reflux disease with esophagitis without hemorrhage  (primary encounter diagnosis)  Comment:   Plan: Gastroesophageal reflux.  Habits: Drinks 2 cups of coffee daily, tobacco use, recently quit.  Recommended omeprazole as needed and counseled regarding lifestyle changes to reduce acid reflux- reduce caffeine and eliminate tobacco avoid alcohol.    (R07.89) Atypical chest pain  Comment:   Plan: Recent urgent care visit for atypical chest pain resolved completely with acid suppression.  Chest pain likely secondary to GERD    (R03.0) Elevated blood pressure reading without diagnosis of hypertension  Comment:   Plan: Discussed lifestyle measures: Weight loss, reduce dietary sodium.  Patient also notes that his home blood pressure unit may have a small cuff which may be falsely elevating results.  Plans to purchase a new home cuff and will forward readings in the next few weeks.    (F17.200) Tobacco use disorder  Comment:   Plan: Recently quit.      Louie Zhu MD  [facility] GOLDIE Hernandez is a 42 year old, presenting for the following health issues:  Hospital F/U      [facility]     ED/UC Followup:    Facility:  [facility]   Date of visit: 11/29/22  Reason for visit: Acute Chest Pain  Current Status: Better - went away     Would like to discuss BP  BP high     42-year-old presents today for follow-up of recent urgent care visit for evaluation of chest pain.  Diagnosed with acid reflux and discharged on PPI.  Patient continued omeprazole and symptoms resolved on this regimen.    Additional concerns today regarding elevated blood pressure.  Has checked his blood pressure at home on a few occasions-readings have been mildly elevated: 150-160 systolic, blood pressure today as noted.  Does have a family history of hypertension.  Alcohol intake- minimal.  Supplements-does take a whey protein supplement unclear whether additional additives.  Has been taking  "supplement to gain weight while weight training.  Does not monitor dietary sodium.    Review of Systems         Objective    BP (!) 140/82 (BP Location: Right arm, Patient Position: Sitting, Cuff Size: Adult Large)   Pulse 92   Temp 98.6  F (37  C) (Tympanic)   Resp 16   Ht 1.803 m (5' 11\")   Wt 108.9 kg (240 lb)   SpO2 97%   BMI 33.47 kg/m    Body mass index is 33.47 kg/m .  Physical Exam   GENERAL: healthy, alert and no distress  NECK: no adenopathy, no asymmetry, masses, or scars and thyroid normal to palpation  RESP: lungs clear to auscultation - no rales, rhonchi or wheezes  CV: regular rate and rhythm, normal S1 S2, no S3 or S4, no murmur, click or rub, no peripheral edema and peripheral pulses strong  MS: no gross musculoskeletal defects noted, no edema  PSYCH: mentation appears normal, affect normal/bright                    "

## 2023-04-16 ENCOUNTER — HEALTH MAINTENANCE LETTER (OUTPATIENT)
Age: 43
End: 2023-04-16

## 2023-11-26 ENCOUNTER — HEALTH MAINTENANCE LETTER (OUTPATIENT)
Age: 43
End: 2023-11-26

## 2024-04-05 ENCOUNTER — NURSE TRIAGE (OUTPATIENT)
Dept: PEDIATRICS | Facility: CLINIC | Age: 44
End: 2024-04-05
Payer: COMMERCIAL

## 2024-04-05 ENCOUNTER — OFFICE VISIT (OUTPATIENT)
Dept: URGENT CARE | Facility: URGENT CARE | Age: 44
End: 2024-04-05
Payer: COMMERCIAL

## 2024-04-05 VITALS
RESPIRATION RATE: 20 BRPM | SYSTOLIC BLOOD PRESSURE: 146 MMHG | OXYGEN SATURATION: 96 % | HEART RATE: 82 BPM | TEMPERATURE: 97.8 F | DIASTOLIC BLOOD PRESSURE: 84 MMHG

## 2024-04-05 DIAGNOSIS — K13.0 ABNORMAL COLOR OF LIPS: ICD-10-CM

## 2024-04-05 DIAGNOSIS — R05.1 ACUTE COUGH: Primary | ICD-10-CM

## 2024-04-05 PROCEDURE — 99214 OFFICE O/P EST MOD 30 MIN: CPT | Performed by: FAMILY MEDICINE

## 2024-04-05 RX ORDER — BENZONATATE 100 MG/1
200 CAPSULE ORAL 3 TIMES DAILY PRN
Qty: 42 CAPSULE | Refills: 3 | Status: SHIPPED | OUTPATIENT
Start: 2024-04-05

## 2024-04-05 RX ORDER — CODEINE PHOSPHATE AND GUAIFENESIN 10; 100 MG/5ML; MG/5ML
1 SOLUTION ORAL EVERY 6 HOURS PRN
Qty: 180 ML | Refills: 0 | Status: SHIPPED | OUTPATIENT
Start: 2024-04-05

## 2024-04-05 RX ORDER — HYDROXYZINE HYDROCHLORIDE 25 MG/1
25 TABLET, FILM COATED ORAL
COMMUNITY
Start: 2024-02-15

## 2024-04-05 RX ORDER — AMLODIPINE BESYLATE 10 MG/1
1 TABLET ORAL DAILY
COMMUNITY
Start: 2024-03-20

## 2024-04-05 NOTE — PROGRESS NOTES
SUBJECTIVE:   David Crane is a 44 year old male presenting with two main complaints:    Complaint #1:  a couple of days of severe nonproductive cough, chest congestion.  The cough has interfered with sleep.  No fevers.  No shortness of breath.    The cough has been severe.     Last week he had a burning chest, ticklish throat (These symptoms resolved), a low-grade fever, diarrhea for a couple of days.      Patient has not tested for COVID-19..  No one at home has had similar symptoms those of the patient.       Complaint #2:  the bottom lip started to turn purple for about 20 minutes at 3 pm today.  The purplish discoloration resolved and has not recurred.  .  No purplish fingers during the time the lip problem started.  .     No shortness of breath  The purple color is not worse when exposed to cold temperatures.   Patient does not smoke  No recent injury to the bottom lip  No chest pain  No purplish fingers/toes   .    Past Medical History:   Diagnosis Date    Benign solitary fibrous neoplasm     pleura    Elevated glucose 10/18/2020    Generalized hyperhidrosis 10/15/2020    Hyperlipidemia LDL goal <160 10/18/2020    Immunization deficiency 10/15/2020    Nephrolithiasis 10/15/2020    Other, mixed, or unspecified nondependent drug abuse, in remission     History of IV drug use, methamphetamine and other drugs.      Septic arthritis of hip (H) 2002     Current Outpatient Medications   Medication Sig Dispense Refill    amLODIPine (NORVASC) 10 MG tablet Take 1 tablet by mouth daily      fluticasone (FLONASE) 50 MCG/ACT nasal spray Spray 1-2 sprays into both nostrils daily 16 g 3    hydrOXYzine HCl (ATARAX) 25 MG tablet Take 25 mg by mouth      omeprazole (PRILOSEC) 20 MG DR capsule TAKE ONE CAPSULE BY MOUTH EVERY DAY (Patient not taking: Reported on 4/5/2024) 90 capsule 0     Social History     Tobacco Use    Smoking status: Former     Packs/day: 1.50     Years: 13.00     Additional pack years: 0.00     Total  pack years: 19.50     Types: Cigarettes     Start date: 6/10/1997     Quit date: 6/10/2010     Years since quittin.8    Smokeless tobacco: Former     Types: Snuff    Tobacco comments:     rarely smokes 1 with his dad   Substance Use Topics    Alcohol use: Not Currently       ROS:  CONSTITUTIONAL:negative for fevers.    ENT/MOUTH:  positive for a recent purplish discoloration at the bottom lip  RESP: positive for severe cough at night and in the morning.      OBJECTIVE:  BP (!) 146/84 (BP Location: Right arm, Patient Position: Sitting, Cuff Size: Adult Large)   Pulse 82   Temp 97.8  F (36.6  C) (Oral)   Resp 20   SpO2 96%   GENERAL APPEARANCE: healthy, alert and no distress.Patient did not cough during this clinic encounter.  Patient has no respiratory distress.    HENT: lips have no cyanosis/discoloration/edema.  No lacerations/abrasions/ecchymosis/hematomas on the lips.  The tongue, gums, oropharynx, palate and buccal mucosa are within normal limits.    NECK: supple, nontender, no lymphadenopathy  RESP: lungs clear to auscultation - no rales, rhonchi or wheezes  CV: regular rates and rhythm, normal S1 S2, no murmur noted  SKIN: no suspicious lesions or rashes    ASSESSMENT:  Acute cough (severe only at night).  Patient's lungs are clear to auscultation, there is no hypoxia, he no tachypnea.      Abnormal color of the lower lip, which resolved after 20 minutes this afternoon.  History is not consistent with Raynaud's phenomenon/embolus/ecchymosis/hematoma.  Patient has no respiratory distress to explain the purplish lower lip.      PLAN:  For the cough:  Rx:  Tessalon Perles  Rx:  Robitussin AC  Try a room humidifier if possible.    Go to the emergency room if you develop worsening severe shortness of breath  Follow up if not better in one week.     For the abnormal lip discoloration:  Go to the emergency room if you develop another purplish lesion appears and starts to turn black.    Follow up if not  better in one week.     Domingo Gaines MD

## 2024-04-05 NOTE — PATIENT INSTRUCTIONS
Try a room humidifier if possible.      Go to the emergency room if you develop worsening severe shortness of breath/if you develop another purplish lesion appears and starts to turn black.      Follow up if not better in one week.

## 2024-04-05 NOTE — TELEPHONE ENCOUNTER
Nurse Triage SBAR    Is this a 2nd Level Triage? NO    Situation: Pt calls to report cough and purple lip    Background: Pt states he recently had covid, and some sx persist. Pt states sx began 1-2 weeks ago. Pt endorses hx HTN. Pt denies hx lung disease or blood clots. Pt denies recent travel out of country.    Assessment: Pt reports frequent coughing, mixed dry and productive and worse at night. Pt unsure of sputum color. Pt reports that about an hour ago his daughter said his bottom lip looked purple. Pt denies any new sx during that time. Pt states his lip color resolved. Pt denies SOB, hemoptysis, difficulty breathing, pain, fever, dizziness and wheezing. Pt speaking in full sentences at normal rate, tone and volume.     Protocol Recommended Disposition:   See in Office Today or Tomorrow    Recommendation: Pt reporting difficulty sleeping d/t cough and inquires about pneumonia. Recommended office visit today per protocol- no availability, instead recommended UC today. Provided local  info. Pt verbalized understanding and agrees with plan.     Reason for Disposition   Patient wants to be seen    Additional Information   Negative: Bluish (or gray) lips or face   Negative: SEVERE difficulty breathing (e.g., struggling for each breath, speaks in single words)   Negative: Rapid onset of cough and has hives   Negative: Coughing started suddenly after medicine, an allergic food or bee sting   Negative: Difficulty breathing after exposure to flames, smoke, or fumes   Negative: Sounds like a life-threatening emergency to the triager   Negative: Previous asthma attacks and this feels like asthma attack   Negative: Dry cough (non-productive; no sputum or minimal clear sputum) and within 14 days of COVID-19 Exposure   Negative: MODERATE difficulty breathing (e.g., speaks in phrases, SOB even at rest, pulse 100-120) and still present when not coughing   Negative: Chest pain present when not coughing   Negative: Passed out  (i.e., fainted, collapsed and was not responding)   Negative: Patient sounds very sick or weak to the triager   Negative: MILD difficulty breathing (e.g., minimal/no SOB at rest, SOB with walking, pulse <100) and still present when not coughing   Negative: Coughed up > 1 tablespoon (15 ml) blood (Exception: Blood-tinged sputum.)   Negative: Fever > 103 F (39.4 C)   Negative: Fever > 101 F (38.3 C) and over 60 years of age   Negative: Fever > 100.0 F (37.8 C) and has diabetes mellitus or a weak immune system (e.g., HIV positive, cancer chemotherapy, organ transplant, splenectomy, chronic steroids)   Negative: Fever > 100.0 F (37.8 C) and bedridden (e.g., CVA, chronic illness, recovering from surgery)   Negative: Increasing ankle swelling   Negative: Wheezing is present   Negative: SEVERE coughing spells (e.g., whooping sound after coughing, vomiting after coughing)   Negative: Coughing up estella-colored (reddish-brown) or blood-tinged sputum   Negative: Fever present > 3 days (72 hours)   Negative: Fever returns after gone for over 24 hours and symptoms worse or not improved   Negative: Using nasal washes and pain medicine > 24 hours and sinus pain persists   Negative: Known COPD or other severe lung disease (i.e., bronchiectasis, cystic fibrosis, lung surgery) and worsening symptoms (i.e., increased sputum purulence or amount, increased breathing difficulty)    Protocols used: Cough-A-NATHANAEL Patel RN on 4/5/2024 at 4:27 PM

## 2024-06-07 ENCOUNTER — OFFICE VISIT (OUTPATIENT)
Dept: URGENT CARE | Facility: URGENT CARE | Age: 44
End: 2024-06-07
Payer: COMMERCIAL

## 2024-06-07 VITALS
TEMPERATURE: 98.1 F | OXYGEN SATURATION: 96 % | HEART RATE: 98 BPM | SYSTOLIC BLOOD PRESSURE: 138 MMHG | DIASTOLIC BLOOD PRESSURE: 85 MMHG | RESPIRATION RATE: 16 BRPM

## 2024-06-07 DIAGNOSIS — R50.9 FEVER IN ADULT: ICD-10-CM

## 2024-06-07 DIAGNOSIS — M25.511 ACUTE PAIN OF RIGHT SHOULDER: ICD-10-CM

## 2024-06-07 DIAGNOSIS — M25.551 BILATERAL HIP PAIN: Primary | ICD-10-CM

## 2024-06-07 DIAGNOSIS — M25.552 BILATERAL HIP PAIN: Primary | ICD-10-CM

## 2024-06-07 LAB
BASOPHILS # BLD AUTO: 0 10E3/UL (ref 0–0.2)
BASOPHILS NFR BLD AUTO: 0 %
EOSINOPHIL # BLD AUTO: 0.1 10E3/UL (ref 0–0.7)
EOSINOPHIL NFR BLD AUTO: 1 %
ERYTHROCYTE [DISTWIDTH] IN BLOOD BY AUTOMATED COUNT: 12 % (ref 10–15)
ERYTHROCYTE [SEDIMENTATION RATE] IN BLOOD BY WESTERGREN METHOD: 2 MM/HR (ref 0–15)
HCT VFR BLD AUTO: 43.9 % (ref 40–53)
HGB BLD-MCNC: 15.6 G/DL (ref 13.3–17.7)
IMM GRANULOCYTES # BLD: 0 10E3/UL
IMM GRANULOCYTES NFR BLD: 0 %
LYMPHOCYTES # BLD AUTO: 2.8 10E3/UL (ref 0.8–5.3)
LYMPHOCYTES NFR BLD AUTO: 30 %
MCH RBC QN AUTO: 30.8 PG (ref 26.5–33)
MCHC RBC AUTO-ENTMCNC: 35.5 G/DL (ref 31.5–36.5)
MCV RBC AUTO: 87 FL (ref 78–100)
MONOCYTES # BLD AUTO: 0.8 10E3/UL (ref 0–1.3)
MONOCYTES NFR BLD AUTO: 9 %
NEUTROPHILS # BLD AUTO: 5.7 10E3/UL (ref 1.6–8.3)
NEUTROPHILS NFR BLD AUTO: 60 %
PLATELET # BLD AUTO: 272 10E3/UL (ref 150–450)
RBC # BLD AUTO: 5.06 10E6/UL (ref 4.4–5.9)
WBC # BLD AUTO: 9.4 10E3/UL (ref 4–11)

## 2024-06-07 PROCEDURE — 85025 COMPLETE CBC W/AUTO DIFF WBC: CPT | Performed by: FAMILY MEDICINE

## 2024-06-07 PROCEDURE — 86431 RHEUMATOID FACTOR QUANT: CPT | Performed by: FAMILY MEDICINE

## 2024-06-07 PROCEDURE — 82550 ASSAY OF CK (CPK): CPT | Performed by: FAMILY MEDICINE

## 2024-06-07 PROCEDURE — 99214 OFFICE O/P EST MOD 30 MIN: CPT | Performed by: FAMILY MEDICINE

## 2024-06-07 PROCEDURE — 36415 COLL VENOUS BLD VENIPUNCTURE: CPT | Performed by: FAMILY MEDICINE

## 2024-06-07 PROCEDURE — 85652 RBC SED RATE AUTOMATED: CPT | Performed by: FAMILY MEDICINE

## 2024-06-07 PROCEDURE — 84443 ASSAY THYROID STIM HORMONE: CPT | Performed by: FAMILY MEDICINE

## 2024-06-07 RX ORDER — AMLODIPINE AND VALSARTAN 5; 160 MG/1; MG/1
1 TABLET ORAL DAILY
COMMUNITY
Start: 2024-05-13

## 2024-06-07 NOTE — PROGRESS NOTES
SUBJECTIVE:   David Crane is a 44 year old male presenting with a chief complaint of off-and-on right proximal inguinal aching and a dull ache at the right anterior distal shoulder.  .  Patient also complains of occasional achy thighs and  three days of low-grade fevers (up to 100.5 F).  .  .    The aches have been mild to moderate.      Onset of symptoms was four days ago.    Current and Associated symptoms:   No morning stiffness at the joints.   no jaw stiffness/fatigue  No new headache  No visual changes  No joint pain at the fingers  No joint stiffness  No weakness  No shortness of breath  No recent tick bites  No recent mosquito bites  No sick contacts with similar symptoms.   No abnormal rashes.  No bullseye rash.    Patient has taken Multivitamin supplements for years.       Treatment measures tried include Ibuprofen which has helped to decrease the aches.  He did not take Ibuprofen today.    ..    Patient started Amlodipine-Valsartan 5-160 mg per tablet once daily on April 17, 2024.         Past Medical History:   Diagnosis Date    Benign solitary fibrous neoplasm     pleura    Elevated glucose 10/18/2020    Generalized hyperhidrosis 10/15/2020    Hyperlipidemia LDL goal <160 10/18/2020    Immunization deficiency 10/15/2020    Nephrolithiasis 10/15/2020    Other, mixed, or unspecified nondependent drug abuse, in remission     History of IV drug use, methamphetamine and other drugs.      Septic arthritis of hip (H) 2002   Primary Hypertension    Current Outpatient Medications   Medication Sig Dispense Refill    amLODIPine-valsartan (EXFORGE) 5-160 MG tablet Take 1 tablet by mouth daily      fluticasone (FLONASE) 50 MCG/ACT nasal spray Spray 1-2 sprays into both nostrils daily 16 g 3    hydrOXYzine HCl (ATARAX) 25 MG tablet Take 25 mg by mouth      amLODIPine (NORVASC) 10 MG tablet Take 1 tablet by mouth daily (Patient not taking: Reported on 6/7/2024)      benzonatate (TESSALON) 100 MG capsule Take 2  capsules (200 mg) by mouth 3 times daily as needed for cough (Patient not taking: Reported on 2024) 42 capsule 3    guaiFENesin-codeine (ROBITUSSIN AC) 100-10 MG/5ML solution Take 5 mLs by mouth every 6 hours as needed for cough (Patient not taking: Reported on 2024) 180 mL 0    omeprazole (PRILOSEC) 20 MG DR capsule TAKE ONE CAPSULE BY MOUTH EVERY DAY (Patient not taking: Reported on 2024) 90 capsule 0     Social History     Tobacco Use    Smoking status: Former     Current packs/day: 0.00     Average packs/day: 1.5 packs/day for 13.0 years (19.5 ttl pk-yrs)     Types: Cigarettes     Start date: 6/10/1997     Quit date: 6/10/2010     Years since quittin.0    Smokeless tobacco: Former     Types: Snuff    Tobacco comments:     rarely smokes 1 with his dad   Substance Use Topics    Alcohol use: Not Currently       ROS:  CONSTITUTIONAL:positive for recent low-grade fever.    MUSCULOSKELETAL: positive for bilateral thigh pain, right shoulder pain, bilateral hip pain.      OBJECTIVE:  /85   Pulse 98   Temp 98.1  F (36.7  C)   Resp 16   SpO2 96%   GENERAL APPEARANCE: healthy, alert and no distress.  Patient is alert and has no fatigue.    EYES: EOMI,  PERRL, conjunctiva clear  NECK: normal palpation of the thyroid gland.    NEURO: mentation intact, speech normal, and DTRs are within normal limits at the biceps, wrists, patellas and Achilles.    RIGHT SHOULDER:  No tenderness with palpation over the distal anterior right shoulder.  Normal ROM at the right shoulder.  No proximal biceps tendon tenderness.  No shoulder impingement is present.    BILATERAL HIPS:  normal ROM at the hip joints.  There is point tenderness over the proximal inguinal regions without lymphadenopathy.      LAB:    Results for orders placed or performed in visit on 24   ESR: Erythrocyte sedimentation rate     Status: Normal   Result Value Ref Range    Erythrocyte Sedimentation Rate 2 0 - 15 mm/hr   CBC with platelets  and differential     Status: None   Result Value Ref Range    WBC Count 9.4 4.0 - 11.0 10e3/uL    RBC Count 5.06 4.40 - 5.90 10e6/uL    Hemoglobin 15.6 13.3 - 17.7 g/dL    Hematocrit 43.9 40.0 - 53.0 %    MCV 87 78 - 100 fL    MCH 30.8 26.5 - 33.0 pg    MCHC 35.5 31.5 - 36.5 g/dL    RDW 12.0 10.0 - 15.0 %    Platelet Count 272 150 - 450 10e3/uL    % Neutrophils 60 %    % Lymphocytes 30 %    % Monocytes 9 %    % Eosinophils 1 %    % Basophils 0 %    % Immature Granulocytes 0 %    Absolute Neutrophils 5.7 1.6 - 8.3 10e3/uL    Absolute Lymphocytes 2.8 0.8 - 5.3 10e3/uL    Absolute Monocytes 0.8 0.0 - 1.3 10e3/uL    Absolute Eosinophils 0.1 0.0 - 0.7 10e3/uL    Absolute Basophils 0.0 0.0 - 0.2 10e3/uL    Absolute Immature Granulocytes 0.0 <=0.4 10e3/uL   CBC with platelets and differential     Status: None    Narrative    The following orders were created for panel order CBC with platelets and differential.  Procedure                               Abnormality         Status                     ---------                               -----------         ------                     CBC with platelets and d...[[id number]]                      Final result                 Please view results for these tests on the individual orders.         ASSESSMENT:  Low-grade fever in an adult. Patient is currently afebrile  No generalized inflammation is present in the body, based on the normal ESR.  Complete blood cell count with differential is normal.     Acute right shoulder pain  Acute bilateral hip pain.      There is no symmetrical distribution typical of polymyalgia rheumatica. (In addition, the erythrocyte sedimentation rate is not elevated.)  Differential diagnosis is broad and includes hypothyroidism, rheumatoid arthritis, Vitamin D deficiency, mild rhabdomyolysis associated with the new Amlodipine-Valsartan (However patient has not had generalized muscle aching).   Patient has normal ROM and no stiffness at the shoulders and  hips.       PLAN:      Pending labs:  Total Creatine Kinase, Rheumatoid Factor, TSH    I decided not to order a Vitamin D level since he has taken multivitamin supplements for years.      Domingo Gaines MD

## 2024-06-08 LAB
CK SERPL-CCNC: 52 U/L (ref 39–308)
RHEUMATOID FACT SERPL-ACNC: <10 IU/ML
TSH SERPL DL<=0.005 MIU/L-ACNC: 1.52 UIU/ML (ref 0.3–4.2)

## 2024-06-08 NOTE — PATIENT INSTRUCTIONS
Follow up with your primary care provider if not better in 7-10 days.      Take Advil, Tylenol for the aches and pains.

## 2024-06-23 ENCOUNTER — HEALTH MAINTENANCE LETTER (OUTPATIENT)
Age: 44
End: 2024-06-23

## 2024-12-08 ENCOUNTER — OFFICE VISIT (OUTPATIENT)
Dept: URGENT CARE | Facility: URGENT CARE | Age: 44
End: 2024-12-08
Payer: COMMERCIAL

## 2024-12-08 ENCOUNTER — NURSE TRIAGE (OUTPATIENT)
Dept: NURSING | Facility: CLINIC | Age: 44
End: 2024-12-08
Payer: COMMERCIAL

## 2024-12-08 ENCOUNTER — ANCILLARY PROCEDURE (OUTPATIENT)
Dept: GENERAL RADIOLOGY | Facility: CLINIC | Age: 44
End: 2024-12-08
Attending: PHYSICIAN ASSISTANT
Payer: COMMERCIAL

## 2024-12-08 VITALS
SYSTOLIC BLOOD PRESSURE: 137 MMHG | TEMPERATURE: 97.2 F | HEART RATE: 88 BPM | OXYGEN SATURATION: 97 % | DIASTOLIC BLOOD PRESSURE: 88 MMHG | WEIGHT: 256 LBS | BODY MASS INDEX: 35.7 KG/M2 | RESPIRATION RATE: 16 BRPM

## 2024-12-08 DIAGNOSIS — J22 LOWER RESP. TRACT INFECTION: ICD-10-CM

## 2024-12-08 DIAGNOSIS — R05.1 ACUTE COUGH: ICD-10-CM

## 2024-12-08 DIAGNOSIS — R05.1 ACUTE COUGH: Primary | ICD-10-CM

## 2024-12-08 PROCEDURE — 71046 X-RAY EXAM CHEST 2 VIEWS: CPT | Mod: TC | Performed by: RADIOLOGY

## 2024-12-08 PROCEDURE — 99214 OFFICE O/P EST MOD 30 MIN: CPT | Performed by: PHYSICIAN ASSISTANT

## 2024-12-08 RX ORDER — PREDNISONE 20 MG/1
40 TABLET ORAL DAILY
Qty: 10 TABLET | Refills: 0 | Status: SHIPPED | OUTPATIENT
Start: 2024-12-08 | End: 2024-12-13

## 2024-12-08 RX ORDER — BENZONATATE 200 MG/1
200 CAPSULE ORAL 3 TIMES DAILY PRN
Qty: 21 CAPSULE | Refills: 0 | Status: SHIPPED | OUTPATIENT
Start: 2024-12-08

## 2024-12-08 NOTE — PROGRESS NOTES
SUBJECTIVE:  David Crane is a 44 year old male mild SOT no fever. Dry in throat.  No chest  worse night.      Past Medical History:   Diagnosis Date    Benign solitary fibrous neoplasm     pleura    Elevated glucose 10/18/2020    Generalized hyperhidrosis 10/15/2020    Hyperlipidemia LDL goal <160 10/18/2020    Immunization deficiency 10/15/2020    Nephrolithiasis 10/15/2020    Other, mixed, or unspecified nondependent drug abuse, in remission     History of IV drug use, methamphetamine and other drugs.      Septic arthritis of hip (H)      Patient Active Problem List   Diagnosis    Tobacco use disorder    MYOPIA [367.1]    Exotropia    Benign solitary fibrous neoplasm    Immunization deficiency    Nephrolithiasis    Generalized hyperhidrosis    Elevated blood pressure reading without diagnosis of hypertension    Hyperlipidemia LDL goal <160     Current Outpatient Medications   Medication Sig Dispense Refill    amLODIPine-valsartan (EXFORGE) 5-160 MG tablet Take 1 tablet by mouth daily      fluticasone (FLONASE) 50 MCG/ACT nasal spray Spray 1-2 sprays into both nostrils daily 16 g 3    hydrOXYzine HCl (ATARAX) 25 MG tablet Take 25 mg by mouth       No current facility-administered medications for this visit.     Social History     Socioeconomic History    Marital status:      Spouse name: Not on file    Number of children: 0    Years of education: Not on file    Highest education level: Not on file   Occupational History    Occupation: rail car loaded     Employer: [employer]     Employer: [employer]   Tobacco Use    Smoking status: Former     Current packs/day: 0.00     Average packs/day: 1.5 packs/day for 13.0 years (19.5 ttl pk-yrs)     Types: Cigarettes     Start date: 6/10/1997     Quit date: 6/10/2010     Years since quittin.5    Smokeless tobacco: Former     Types: Snuff    Tobacco comments:     rarely smokes 1 with his dad   Vaping Use    Vaping status:  Never Used   Substance and Sexual Activity    Alcohol use: Not Currently    Drug use: Not Currently     Comment: history of     Sexual activity: Not Currently   Other Topics Concern     Service No    Blood Transfusions No    Caffeine Concern No    Occupational Exposure Yes     Comment: [employer], and [employer]    Hobby Hazards No    Sleep Concern No    Stress Concern No    Weight Concern No    Special Diet No    Back Care No    Exercise No    Bike Helmet Not Asked    Seat Belt Yes    Self-Exams Not Asked   Social History Narrative    Not on file     Social Drivers of Health     Financial Resource Strain: Not on File (2024)    Received from [facility]    Financial Resource Strain     Financial Resource Strain: 0   Food Insecurity: Not on File (2024)    Received from [facility]    Food Insecurity     Food: 0   Transportation Needs: Not on File (2024)    Received from [facility]    Transportation Needs     Transportation: 0   Physical Activity: Not on File (2024)    Received from [facility]    Physical Activity     Physical Activity: 0   Stress: Not on File (2024)    Received from [facility]    Stress     Stress: 0   Social Connections: Not on File (9/15/2024)    Received from [facility]    Social Connections     Connectedness: 0   Interpersonal Safety: Not on file   Housing Stability: Not on File (2024)    Received from [facility]    Housing Stability     Housin     ROS  negative other than stated above    Exam:  {:[id number]}    ***   above    Exam:  GENERAL APPEARANCE: healthy, alert and no distress  EYES: EOMI,  PERRL  HENT: ear canals and TM's normal and nose and mouth without ulcers or lesions  NECK: no adenopathy, no asymmetry, masses, or scars and thyroid normal to palpation  RESP: Patient with harsh spasmodic cough along with some diffuse wheezing.  CV: regular rates and rhythm, normal S1 S2, no S3 or S4 and no murmur, click or rub -  SKIN: no suspicious lesions or rashes    Chest x-ray with no clear infiltrate noted per my independent read pending radiology review    assessment/plan:  (R05.1) Acute cough  (primary encounter diagnosis)  Comment:   Plan: XR Chest 2 Views        Patient with almost 1 week history of URI related symptoms now settling into the chest.  There is no clear infiltrate noted per my independent read.  Does have some wheezing at this time.  Will give prednisone and side effects were discussed.  Tessalon Perles for comfort measure.  If symptoms fail to improve start Augmentin.  Continue to monitor symptoms.  Use over-the-counter med for symptomatic relief.  Follow-up with primary if symptoms worsen or new symptoms develop.    (J22) Lower resp. tract infection  Comment:   Plan: amoxicillin-clavulanate (AUGMENTIN) 875-125 MG         tablet, predniSONE (DELTASONE) 20 MG tablet,         benzonatate (TESSALON) 200 MG capsule

## 2024-12-08 NOTE — TELEPHONE ENCOUNTER
Nurse Triage SBAR    Is this a 2nd Level Triage? YES, LICENSED PRACTITIONER REVIEW IS REQUIRED - will be seen at [place] [facility]     Situation: Pt has a cough for the past 5 days     Background: Pt has a cough for the past 5 days     Assessment:     Pt states that it started with a scratchy throat     Pt has a cough for the past 5 days  - symptoms started on Monday 12/02/2024    Cough is a dry cough and will be productive at times     Denies fever     Pt feels a little dizzy at times - not to the point where he needs to furniture touch when walking around     Pt is having some shortness of breath when walking around     No blue color on lips or face     Protocol Recommended Disposition: See HCP Within 4 Hours (Or PCP Triage)   No disposition on file.    Recommendation: See HCP Within 4 Hours (Or PCP Triage)  - pt will be going to [place] [facility] for evaluation     Care advice given per protocol and when to call back. Pt verbalized understanding and agrees to plan of care.    Salena Rainey RN  [facility]  2:45 PM 12/8/2024    Reason for Disposition   [1] MILD difficulty breathing (e.g., minimal/no SOB at rest, SOB with walking, pulse <100) AND [2] still present when not coughing    Additional Information   Negative: SEVERE difficulty breathing (e.g., struggling for each breath, speaks in single words)   Negative: Bluish (or gray) lips or face now   Negative: [1] Rapid onset of cough AND [2] has hives   Negative: Coughing started suddenly after medicine, an allergic food or bee sting   Negative: [1] Difficulty breathing AND [2] exposure to flames, smoke, or fumes   Negative: [1] Stridor AND [2] difficulty breathing   Negative: Sounds like a life-threatening emergency to the triager   Negative: Choked on object of food that could be caught in the throat   Negative: Chest pain is main symptom   Negative: [1] Previous asthma attacks AND [2] this feels like asthma attack   Negative: Cough lasts  > 3 weeks   Negative: Wet cough (productive; white-yellow, yellow, green, or estella colored sputum)   Negative: [1] Dry cough (non-productive;  no sputum or minimal clear sputum) AND [2] within 14 days of COVID-19 Exposure   Negative: [1] MODERATE difficulty breathing (e.g., speaks in phrases, SOB even at rest, pulse 100-120) AND [2] still present when not coughing   Negative: Chest pain  (Exception: MILD central chest pain, present only when coughing.)   Negative: Patient sounds very sick or weak to the triager    Protocols used: Cough - Acute Non-Productive-A-AH

## 2025-01-04 ENCOUNTER — HEALTH MAINTENANCE LETTER (OUTPATIENT)
Age: 45
End: 2025-01-04

## 2025-01-20 ENCOUNTER — NURSE TRIAGE (OUTPATIENT)
Dept: NURSING | Facility: CLINIC | Age: 45
End: 2025-01-20

## 2025-01-21 NOTE — TELEPHONE ENCOUNTER
Nurse Triage SBAR    Is this a 2nd Level Triage? NO    Situation: Coughing up brown sputum    Background: Was sick in early December which he recovered from, and now is sick again since Friday.    Assessment: Coughing up brown sputum.  Did have a fever up to 103 but has broken, and today afebrile.  Denies chest pain, denies dyspnea with walking.    Protocol Recommended Disposition:   See PCP Within 24 Hours    Recommendation: Patient verbalizes understanding of care advice but will monitor and see how he feels tomorrow.    Josie Leigh RN  [facility]       Reason for Disposition   Coughing up estella-colored (reddish-brown) sputum    Additional Information   Negative: SEVERE difficulty breathing (e.g., struggling for each breath, speaks in single words)   Negative: Bluish (or gray) lips or face now   Negative: [1] Difficulty breathing AND [2] exposure to flames, smoke, or fumes   Negative: [1] Stridor AND [2] difficulty breathing   Negative: Sounds like a life-threatening emergency to the triager   Negative: [1] MODERATE difficulty breathing (e.g., speaks in phrases, SOB even at rest, pulse 100-120) AND [2] still present when not coughing   Negative: Chest pain  (Exception: MILD central chest pain, present only when coughing.)   Negative: Patient sounds very sick or weak to the triager   Negative: [1] MILD difficulty breathing (e.g., minimal/no SOB at rest, SOB with walking, pulse <100) AND [2] still present when not coughing   Negative: [1] Coughed up blood AND [2] > 1 tablespoon (15 ml)   (Exception: Blood-tinged sputum.)   Negative: Fever > 103 F (39.4 C)   Negative: [1] Fever > 101 F (38.3 C) AND [2] age > 60 years   Negative: [1] Fever > 100.0 F (37.8 C) AND [2] bedridden (e.g., CVA, chronic illness, recovering from surgery)   Negative: [1] Fever > 100.0 F (37.8 C) AND [2] diabetes mellitus or weak immune system (e.g., HIV positive, cancer chemo, splenectomy, organ transplant, chronic steroids)    Negative: SEVERE coughing spells (e.g., whooping sound after coughing, vomiting after coughing)   Negative: [1] Continuous (nonstop) coughing interferes with work or school AND [2] no improvement using cough treatment per Care Advice    Protocols used: Cough - Acute [id number]

## 2025-01-23 ENCOUNTER — ANCILLARY PROCEDURE (OUTPATIENT)
Dept: GENERAL RADIOLOGY | Facility: CLINIC | Age: 45
End: 2025-01-23
Attending: PHYSICIAN ASSISTANT

## 2025-01-23 ENCOUNTER — OFFICE VISIT (OUTPATIENT)
Dept: URGENT CARE | Facility: URGENT CARE | Age: 45
End: 2025-01-23

## 2025-01-23 VITALS
DIASTOLIC BLOOD PRESSURE: 95 MMHG | HEART RATE: 82 BPM | TEMPERATURE: 97.4 F | WEIGHT: 255 LBS | BODY MASS INDEX: 35.57 KG/M2 | OXYGEN SATURATION: 95 % | SYSTOLIC BLOOD PRESSURE: 149 MMHG | RESPIRATION RATE: 18 BRPM

## 2025-01-23 DIAGNOSIS — R05.1 ACUTE COUGH: Primary | ICD-10-CM

## 2025-01-23 DIAGNOSIS — R50.9 FEVER IN ADULT: ICD-10-CM

## 2025-01-23 LAB
DEPRECATED S PYO AG THROAT QL EIA: NEGATIVE
FLUAV AG SPEC QL IA: NEGATIVE
FLUBV AG SPEC QL IA: NEGATIVE
S PYO DNA THROAT QL NAA+PROBE: NOT DETECTED

## 2025-01-23 RX ORDER — BENZONATATE 100 MG/1
100 CAPSULE ORAL 3 TIMES DAILY PRN
Qty: 30 CAPSULE | Refills: 0 | Status: SHIPPED | OUTPATIENT
Start: 2025-01-23

## 2025-01-23 ASSESSMENT — ENCOUNTER SYMPTOMS
FEVER: 1
SORE THROAT: 1
COUGH: 1

## 2025-01-23 NOTE — PATIENT INSTRUCTIONS
For productive cough I suggest using over the counter medications such as guaifenesin (Mucinex). Mucinex will reduce the viscosity of mucus secretions and help with productive cough. Drink plenty of fluids while on Mucinex.   You can also take the cough suppressant I prescribed, tessalon perles at night to suppress the cough/ Follow up in clinic if symptoms persist or worsen. This usually can last 7-10 days.

## 2025-01-23 NOTE — PROGRESS NOTES
Assessment & Plan       1. Acute cough (Primary)    -Influenza  A and B (-)  - Influenza A & B Antigen - Clinic Collect  - Streptococcus A Rapid Screen w/Reflex to PCR - Clinic Collect  - COVID-19 Virus (Coronavirus) by PCR Nose  - XR Chest 2 Views  - Group A Streptococcus PCR Throat Swab  - benzonatate (TESSALON) 100 MG capsule; Take 1 capsule (100 mg) by mouth 3 times daily as needed for cough.  Dispense: 30 capsule; Refill: 0    2. Fever in adult    -Strep (-)  - Influenza A & B Antigen - Clinic Collect  - Streptococcus A Rapid Screen w/Reflex to PCR - Clinic Collect  - COVID-19 Virus (Coronavirus) by PCR Nose  - Group A Streptococcus PCR Throat Swab     Results for orders placed or performed in visit on 01/23/25   XR Chest 2 Views     Status: None    Narrative    EXAM: XR CHEST 2 VIEWS  LOCATION: [facility] [place]  DATE: 1/23/2025    INDICATION: cough and fever  COMPARISON: 12/8/2024      Impression    IMPRESSION: No acute cardiopulmonary abnormality.   Influenza A & B Antigen - Clinic Collect     Status: Normal    Specimen: Nose; Swab   Result Value Ref Range    Influenza A antigen Negative Negative    Influenza B antigen Negative Negative    Narrative    Test results must be correlated with clinical data. If necessary, results should be confirmed by a molecular assay or viral culture.   Streptococcus A Rapid Screen w/Reflex to PCR - Clinic Collect     Status: Normal    Specimen: Throat; Swab   Result Value Ref Range    Group A Strep antigen Negative Negative       Patient Instructions   For productive cough I suggest using over the counter medications such as guaifenesin (Mucinex). Mucinex will reduce the viscosity of mucus secretions and help with productive cough. Drink plenty of fluids while on Mucinex.   You can also take the cough suppressant I prescribed, tessalon perles at night to suppress the cough/ Follow up in clinic if symptoms persist or worsen. This usually can last 7-10 days.        Return if symptoms worsen or fail to improve, for Follow up, 5 days.    At the end of the encounter, I discussed results, diagnosis, medications. Discussed red flags for immediate return to clinic/ER, as well as indications for follow up if no improvement. Patient understood and agreed to plan. Patient was stable for discharge.    Ricky Hernandez is a 45 year old male who presents to clinic today  for the following health issues:  Chief Complaint   Patient presents with    Urgent Care     Cough, chest congestion and fever, sore throat  x Friday      HPI    Patient reports fever for 6 days.  He also reports productive cough. He reports temp was 100.9  F today.  He also reports sore throat.  He has been taking ibuprofen as needed.  Denies shortness of breath, chest pain.     Review of Systems   Constitutional:  Positive for fever.   HENT:  Positive for sore throat.    Respiratory:  Positive for cough.        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      Septic arthritis of hip (H) 2002       Social History     Tobacco Use    Smoking status: Former     Current packs/day: 0.00     Average  packs/day: 1.5 packs/day for 13.0 years (19.5 ttl pk-yrs)     Types: Cigarettes     Start date: 6/10/1997     Quit date: 6/10/2010     Years since quittin.6    Smokeless tobacco: Former     Types: Snuff    Tobacco comments:     rarely smokes 1 with his dad   Substance Use Topics    Alcohol use: Not Currently           Objective    BP (!) 149/95   Pulse 82   Temp 97.4  F (36.3  C) (Tympanic)   Resp 18   Wt 115.7 kg (255 lb)   SpO2 95%   BMI 35.57 kg/m    Physical Exam  Constitutional:       Appearance: Normal appearance.   HENT:      Head: Normocephalic.      Mouth/Throat:      Mouth: Mucous membranes are moist.      Pharynx: Oropharynx is clear. Uvula midline. No posterior oropharyngeal erythema.   Cardiovascular:      Rate and Rhythm: Normal rate and regular rhythm.   Pulmonary:      Effort: Pulmonary effort is normal.      Breath sounds: Normal breath sounds.   Lymphadenopathy:      Head:      Right side of head: No submental, submandibular or tonsillar adenopathy.      Left side of head: No submental, submandibular or tonsillar adenopathy.      Cervical: No cervical adenopathy.      Right cervical: No superficial cervical adenopathy.     Left cervical: No superficial cervical adenopathy.   Skin:     General: Skin is warm and dry.      Findings: No rash.   Neurological:      Mental Status: He is alert.   Psychiatric:         Mood and Affect: Mood normal.         Behavior: Behavior normal.              Maile Marie PA-C

## 2025-07-12 ENCOUNTER — HEALTH MAINTENANCE LETTER (OUTPATIENT)
Age: 45
End: 2025-07-12

## 2025-08-01 ENCOUNTER — HOSPITAL ENCOUNTER (OUTPATIENT)
Dept: CT IMAGING | Facility: CLINIC | Age: 45
Discharge: HOME | End: 2025-08-01
Attending: STUDENT IN AN ORGANIZED HEALTH CARE EDUCATION/TRAINING PROGRAM | Admitting: STUDENT IN AN ORGANIZED HEALTH CARE EDUCATION/TRAINING PROGRAM
Payer: COMMERCIAL

## 2025-08-01 DIAGNOSIS — J94.8 PLEURAL MASS: ICD-10-CM

## 2025-08-01 PROCEDURE — 250N000011 HC RX IP 250 OP 636: Performed by: RADIOLOGY

## 2025-08-01 PROCEDURE — 250N000009 HC RX 250: Performed by: RADIOLOGY

## 2025-08-01 PROCEDURE — 71260 CT THORAX DX C+: CPT

## 2025-08-01 RX ORDER — IOPAMIDOL 755 MG/ML
125 INJECTION, SOLUTION INTRAVASCULAR ONCE
Status: COMPLETED | OUTPATIENT
Start: 2025-08-01 | End: 2025-08-01

## 2025-08-01 RX ADMIN — SODIUM CHLORIDE 76 ML: 9 INJECTION, SOLUTION INTRAVENOUS at 10:23

## 2025-08-01 RX ADMIN — IOPAMIDOL 125 ML: 755 INJECTION, SOLUTION INTRAVENOUS at 10:22

## 2025-08-02 ENCOUNTER — NURSE TRIAGE (OUTPATIENT)
Dept: NURSING | Facility: CLINIC | Age: 45
End: 2025-08-02
Payer: COMMERCIAL

## (undated) DEVICE — ENDO VALVE BX EVIS MAJ-210

## (undated) DEVICE — LINEN TOWEL PACK X5 5464

## (undated) DEVICE — Device

## (undated) DEVICE — ESU GROUND PAD ADULT W/CORD E7507

## (undated) DEVICE — KIT ENDO FIRST STEP DISINFECTANT 200ML W/POUCH EP-4

## (undated) DEVICE — SU VICRYL 2-0 SH 27" UND J417H

## (undated) DEVICE — TUBING SUCTION 10'X3/16" N510

## (undated) DEVICE — ENDO POUCH UNIV RETRIEVAL SYSTEM INZII 10MM CD001

## (undated) DEVICE — LINEN TOWEL PACK X6 WHITE 5487

## (undated) DEVICE — SOL WATER IRRIG 1000ML BOTTLE 2F7114

## (undated) DEVICE — GLOVE SENSICARE 7.5 MSG1075 LATEX FREE

## (undated) DEVICE — DAVINCI XI REDUCER 8-12MM 470381

## (undated) DEVICE — STPL DAVINCI SUREFORM 45MM STR TIPRELOAD 12FIRE 480445

## (undated) DEVICE — DRAPE IOBAN INCISE 23X17" 6650EZ

## (undated) DEVICE — ESU ELEC BLADE 2.75" COATED/INSULATED E1455

## (undated) DEVICE — DAVINCI XI SEAL UNIVERSAL 5-8MM 470361

## (undated) DEVICE — SU MONOCRYL 4-0 PS-2 27" UND Y426H

## (undated) DEVICE — SU SILK 0 SH 30" K834H

## (undated) DEVICE — ENDO VALVE SUCTION BRONCH EVIS MAJ-209

## (undated) DEVICE — SU SILK 0 TIE 6X30" A306H

## (undated) DEVICE — DAVINCI XI DRAPE COLUMN 470341

## (undated) DEVICE — SYSTEM CLEARIFY VISUALIZATION 21-345

## (undated) DEVICE — TIES BANDING T50R

## (undated) DEVICE — DAVINCI XI DRAPE ARM 470015

## (undated) DEVICE — SOL NACL 0.9% IRRIG 1000ML BOTTLE 2F7124

## (undated) DEVICE — STPL DAVINCI SUREFORM 45MM RELOAD BLUE 48345B

## (undated) DEVICE — SYR 30ML LL W/O NDL 302832

## (undated) DEVICE — ESU PENCIL W/COATED BLADE E2450H

## (undated) DEVICE — SU VICRYL 0 UR-6 27" J603H

## (undated) DEVICE — LUBRICANT INST ELECTROLUBE EL101

## (undated) DEVICE — PREP CHLORAPREP 26ML TINTED ORANGE  260815

## (undated) DEVICE — DRAIN CHEST TUBE 28FR STR 8028

## (undated) DEVICE — TUBING CONMED AIRSEAL SMOKE EVAC INSUFFLATION ASM-EVAC

## (undated) DEVICE — DAVINCI SEAL CANNULA AND STPL 12MM 470380

## (undated) DEVICE — SUCTION DRY CHEST DRAIN OASIS 3600-100

## (undated) DEVICE — DAVINCI XI GRASPER FENESTRATED TIP UP 8MM 470347

## (undated) DEVICE — ESU GROUND PAD ADULT REM W/15' CORD E7507DB

## (undated) RX ORDER — FENTANYL CITRATE 50 UG/ML
INJECTION, SOLUTION INTRAMUSCULAR; INTRAVENOUS
Status: DISPENSED
Start: 2020-09-23

## (undated) RX ORDER — PROPOFOL 10 MG/ML
INJECTION, EMULSION INTRAVENOUS
Status: DISPENSED
Start: 2020-09-23

## (undated) RX ORDER — DEXAMETHASONE SODIUM PHOSPHATE 4 MG/ML
INJECTION, SOLUTION INTRA-ARTICULAR; INTRALESIONAL; INTRAMUSCULAR; INTRAVENOUS; SOFT TISSUE
Status: DISPENSED
Start: 2020-09-23

## (undated) RX ORDER — CELECOXIB 200 MG/1
CAPSULE ORAL
Status: DISPENSED
Start: 2020-09-23

## (undated) RX ORDER — ACETAMINOPHEN 325 MG/1
TABLET ORAL
Status: DISPENSED
Start: 2020-09-23

## (undated) RX ORDER — DEXAMETHASONE SODIUM PHOSPHATE 10 MG/ML
INJECTION, SOLUTION INTRAMUSCULAR; INTRAVENOUS
Status: DISPENSED
Start: 2020-09-23

## (undated) RX ORDER — FENTANYL CITRATE-0.9 % NACL/PF 10 MCG/ML
PLASTIC BAG, INJECTION (ML) INTRAVENOUS
Status: DISPENSED
Start: 2020-09-23

## (undated) RX ORDER — CEFAZOLIN SODIUM 2 G/100ML
INJECTION, SOLUTION INTRAVENOUS
Status: DISPENSED
Start: 2020-09-23

## (undated) RX ORDER — HYDROMORPHONE HYDROCHLORIDE 1 MG/ML
INJECTION, SOLUTION INTRAMUSCULAR; INTRAVENOUS; SUBCUTANEOUS
Status: DISPENSED
Start: 2020-09-23

## (undated) RX ORDER — ONDANSETRON 2 MG/ML
INJECTION INTRAMUSCULAR; INTRAVENOUS
Status: DISPENSED
Start: 2020-09-23

## (undated) RX ORDER — GABAPENTIN 300 MG/1
CAPSULE ORAL
Status: DISPENSED
Start: 2020-09-23

## (undated) RX ORDER — BUPIVACAINE HYDROCHLORIDE 2.5 MG/ML
INJECTION, SOLUTION EPIDURAL; INFILTRATION; INTRACAUDAL
Status: DISPENSED
Start: 2020-09-23

## (undated) RX ORDER — DEXMEDETOMIDINE HYDROCHLORIDE 100 UG/ML
INJECTION, SOLUTION INTRAVENOUS
Status: DISPENSED
Start: 2020-09-23

## (undated) RX ORDER — EPHEDRINE SULFATE 50 MG/ML
INJECTION, SOLUTION INTRAMUSCULAR; INTRAVENOUS; SUBCUTANEOUS
Status: DISPENSED
Start: 2020-09-23